# Patient Record
Sex: FEMALE | Race: WHITE | Employment: FULL TIME | URBAN - METROPOLITAN AREA
[De-identification: names, ages, dates, MRNs, and addresses within clinical notes are randomized per-mention and may not be internally consistent; named-entity substitution may affect disease eponyms.]

---

## 2019-07-11 ENCOUNTER — HOSPITAL ENCOUNTER (EMERGENCY)
Age: 24
Discharge: HOME | End: 2019-07-11
Payer: COMMERCIAL

## 2019-07-11 ENCOUNTER — APPOINTMENT (OUTPATIENT)
Dept: GENERAL RADIOLOGY | Age: 24
End: 2019-07-11
Payer: COMMERCIAL

## 2019-07-11 VITALS
HEIGHT: 62 IN | OXYGEN SATURATION: 99 % | RESPIRATION RATE: 16 BRPM | TEMPERATURE: 98.2 F | HEART RATE: 94 BPM | BODY MASS INDEX: 29.08 KG/M2 | SYSTOLIC BLOOD PRESSURE: 148 MMHG | WEIGHT: 158 LBS | DIASTOLIC BLOOD PRESSURE: 117 MMHG
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
## 2019-07-11 DIAGNOSIS — S29.012A STRAIN OF THORACIC BACK REGION: ICD-10-CM

## 2019-07-11 DIAGNOSIS — S39.012A STRAIN OF LUMBAR REGION, INITIAL ENCOUNTER: ICD-10-CM

## 2019-07-11 LAB — HCG(URINE) PREGNANCY TEST: NEGATIVE

## 2019-07-11 PROCEDURE — 72070 X-RAY EXAM THORAC SPINE 2VWS: CPT

## 2019-07-11 PROCEDURE — 84703 CHORIONIC GONADOTROPIN ASSAY: CPT

## 2019-07-11 PROCEDURE — 72100 X-RAY EXAM L-S SPINE 2/3 VWS: CPT

## 2019-07-11 PROCEDURE — 99284 EMERGENCY DEPT VISIT MOD MDM: CPT

## 2019-07-11 PROCEDURE — 6370000000 HC RX 637 (ALT 250 FOR IP): Performed by: PHYSICIAN ASSISTANT

## 2019-07-11 RX ORDER — CYCLOBENZAPRINE HCL 10 MG
10 TABLET ORAL 3 TIMES DAILY PRN
Qty: 15 TABLET | Refills: 0 | Status: SHIPPED | OUTPATIENT
Start: 2019-07-11

## 2019-07-11 RX ORDER — CYCLOBENZAPRINE HCL 10 MG
10 TABLET ORAL ONCE
Status: COMPLETED | OUTPATIENT
Start: 2019-07-11 | End: 2019-07-11

## 2019-07-11 RX ADMIN — CYCLOBENZAPRINE HYDROCHLORIDE 10 MG: 10 TABLET, FILM COATED ORAL at 21:53

## 2019-07-11 ASSESSMENT — ENCOUNTER SYMPTOMS
ABDOMINAL PAIN: 0
SHORTNESS OF BREATH: 0
EYES NEGATIVE: 1
COUGH: 0
COLOR CHANGE: 0
BACK PAIN: 1

## 2019-07-11 ASSESSMENT — PAIN SCALES - GENERAL: PAINLEVEL_OUTOF10: 5
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
## 2022-04-27 ENCOUNTER — OFFICE VISIT (OUTPATIENT)
Dept: OBGYN CLINIC | Age: 27
End: 2022-04-27
Payer: COMMERCIAL

## 2022-04-27 VITALS
BODY MASS INDEX: 38.04 KG/M2 | SYSTOLIC BLOOD PRESSURE: 130 MMHG | DIASTOLIC BLOOD PRESSURE: 76 MMHG | HEART RATE: 97 BPM | TEMPERATURE: 98.8 F | WEIGHT: 208 LBS

## 2022-04-27 DIAGNOSIS — N91.2 AMENORRHEA: ICD-10-CM

## 2022-04-27 DIAGNOSIS — Z11.3 SCREENING EXAMINATION FOR STD (SEXUALLY TRANSMITTED DISEASE): ICD-10-CM

## 2022-04-27 DIAGNOSIS — Z98.891 HISTORY OF C-SECTION: ICD-10-CM

## 2022-04-27 DIAGNOSIS — N91.2 AMENORRHEA: Primary | ICD-10-CM

## 2022-04-27 DIAGNOSIS — I10 CHRONIC HYPERTENSION: ICD-10-CM

## 2022-04-27 DIAGNOSIS — Z87.59 HISTORY OF HEMOLYSIS, ELEVATED LIVER ENZYMES, AND LOW PLATELET (HELLP) SYNDROME: ICD-10-CM

## 2022-04-27 DIAGNOSIS — Z12.4 PAP SMEAR FOR CERVICAL CANCER SCREENING: ICD-10-CM

## 2022-04-27 DIAGNOSIS — O26.899 RH NEGATIVE, ANTEPARTUM: ICD-10-CM

## 2022-04-27 DIAGNOSIS — Z01.419 WOMEN'S ANNUAL ROUTINE GYNECOLOGICAL EXAMINATION: Primary | ICD-10-CM

## 2022-04-27 DIAGNOSIS — Z67.91 RH NEGATIVE, ANTEPARTUM: ICD-10-CM

## 2022-04-27 LAB
BACTERIA: ABNORMAL /HPF
BILIRUBIN URINE: NEGATIVE
BLOOD, URINE: NEGATIVE
CLARITY: CLEAR
COLOR: YELLOW
EPITHELIAL CELLS, UA: 3 /HPF (ref 0–5)
GLUCOSE URINE: NEGATIVE MG/DL
HYALINE CASTS: 0 /LPF (ref 0–8)
KETONES, URINE: NEGATIVE MG/DL
LEUKOCYTE ESTERASE, URINE: ABNORMAL
MICROSCOPIC EXAMINATION: YES
NITRITE, URINE: NEGATIVE
PH UA: 7 (ref 5–8)
PROTEIN UA: NEGATIVE MG/DL
RBC UA: 1 /HPF (ref 0–4)
SPECIFIC GRAVITY UA: 1.01 (ref 1–1.03)
URINE TYPE: ABNORMAL
UROBILINOGEN, URINE: 0.2 E.U./DL
WBC UA: 1 /HPF (ref 0–5)

## 2022-04-27 PROCEDURE — 1036F TOBACCO NON-USER: CPT | Performed by: OBSTETRICS & GYNECOLOGY

## 2022-04-27 PROCEDURE — 76801 OB US < 14 WKS SINGLE FETUS: CPT | Performed by: OBSTETRICS & GYNECOLOGY

## 2022-04-27 PROCEDURE — G8427 DOCREV CUR MEDS BY ELIG CLIN: HCPCS | Performed by: OBSTETRICS & GYNECOLOGY

## 2022-04-27 PROCEDURE — 99203 OFFICE O/P NEW LOW 30 MIN: CPT | Performed by: OBSTETRICS & GYNECOLOGY

## 2022-04-27 PROCEDURE — 81025 URINE PREGNANCY TEST: CPT | Performed by: OBSTETRICS & GYNECOLOGY

## 2022-04-27 PROCEDURE — G8417 CALC BMI ABV UP PARAM F/U: HCPCS | Performed by: OBSTETRICS & GYNECOLOGY

## 2022-04-27 RX ORDER — NIFEDIPINE 30 MG/1
30 TABLET, FILM COATED, EXTENDED RELEASE ORAL 2 TIMES DAILY
COMMUNITY

## 2022-04-28 LAB
CANDIDA SPECIES, DNA PROBE: ABNORMAL
CRL: NORMAL
GARDNERELLA VAGINALIS, DNA PROBE: ABNORMAL
SAC DIAMETER: NORMAL
TRICHOMONAS VAGINALIS DNA: ABNORMAL

## 2022-04-29 LAB
C TRACH DNA GENITAL QL NAA+PROBE: NEGATIVE
N. GONORRHOEAE DNA: NEGATIVE
URINE CULTURE, ROUTINE: NORMAL

## 2022-04-29 NOTE — PROGRESS NOTES
Subjective:      Patient ID: Kisha Wisdom is a 28 y.o. female. HPI  'Nanci'  26 y/o  female presents for new patient evaluation of missed menses/amenorrhea. Last pap smear was 20--normal.  Menarche occurred age [age]. Menses occur every month x 6-7 days, medium flow. Discontinued OCPs and since that time has noted dysmenorrhea/pelvic pain located at her  scar. FDLMP: 22. Denies vaginal bleeding and discharge since February. History is positive for low transverse  on 13 secondary to HELLP, severe pre-eclampsia. Medical history is positive for chronic HTN--takes nifedipine 30mg daily. Surgical history is positive for C/Section, surgery to tailbone x 6 and emergency appendectomy (ruptured) in 2019. Denies history of ovarian cysts, uterine fibroids, pelvic infections and endometriosis. Sees primary care at [facility] in [facility]. Allergies: cefzil--hives  20--HgA1C 5.3    Review of Systems   Constitutional: Negative for activity change, appetite change, chills, fatigue, fever and unexpected weight change. Respiratory: Negative for shortness of breath. Cardiovascular: Negative for chest pain. Gastrointestinal: Negative for abdominal distention, abdominal pain, constipation, diarrhea, nausea and vomiting. Endocrine: Negative for cold intolerance and heat intolerance. Genitourinary: Positive for menstrual problem. Negative for difficulty urinating, dyspareunia, dysuria, frequency, genital sores, hematuria, pelvic pain, vaginal bleeding, vaginal discharge and vaginal pain. Skin: Negative for rash. Neurological: Negative for headaches. Hematological: Does not bruise/bleed easily. Objective:   Physical Exam  Vitals and nursing note reviewed. Exam conducted with a chaperone present. Constitutional:       General: She is not in acute distress. Appearance: Normal appearance. She is well-developed.  She is not ill-appearing or toxic-appearing. HENT:      Head: Normocephalic and atraumatic. Neck:      Thyroid: No thyromegaly. Trachea: Trachea normal.   Cardiovascular:      Rate and Rhythm: Normal rate and regular rhythm. Heart sounds: Normal heart sounds, S1 normal and S2 normal.   Pulmonary:      Effort: Pulmonary effort is normal. No respiratory distress. Breath sounds: Normal breath sounds. Chest:   Breasts: Breasts are symmetrical.      Right: No inverted nipple, mass, nipple discharge, skin change or tenderness. Left: No inverted nipple, mass, nipple discharge, skin change or tenderness. Abdominal:      General: Bowel sounds are normal. There is no distension. Palpations: Abdomen is soft. Abdomen is not rigid. There is no mass. Tenderness: There is no abdominal tenderness. There is no guarding or rebound. Comments: Wound hypertrophy noted. Genitourinary:     General: Normal vulva. Exam position: Lithotomy position. Labia:         Right: No rash, tenderness, lesion or injury. Left: No rash, tenderness, lesion or injury. Vagina: No signs of injury. No vaginal discharge, erythema, tenderness or bleeding. Cervix: No cervical motion tenderness, discharge or friability. Uterus: Not tender. Adnexa:         Right: No mass, tenderness or fullness. Left: No mass, tenderness or fullness. Musculoskeletal:         General: Normal range of motion. Cervical back: Neck supple. Skin:     General: Skin is warm and dry. Findings: No erythema or rash. Nails: There is no clubbing. Neurological:      Mental Status: She is alert and oriented to person, place, and time. Psychiatric:         Mood and Affect: Mood normal.         Speech: Speech normal.         Behavior: Behavior normal. Behavior is cooperative. Thought Content:  Thought content normal.         Judgment: Judgment normal.       OBSTETRIC ULTRASOUND--1ST TRIMESTER    DATE: 2022    PHYSICIAN: KRISTIN Barrett D.O.     SONOGRAPHER: Ronnie Alejandre RUST    INDICATION: Amenorrhea/missed menses    TYPE OF SCAN:  vaginal    FINDINGS:      The cul de sac is normal.  The cervix is normal.  The uterus is gravid. The uterus measures 11.40 cm x 7.52 cm x 6.74 cm. No uterine anomalies are evident. The right ovary is present. The right ovary measures 2.47 cm x 2.27 cm x 2.08 cm. The left ovary is present. The left ovary measures 3.93 cm x 2.94 cm x 2.87 cm. There is a single intrauterine pregnancy identified. A fetal pole is noted with a CRL measuring 2.19 cm, consistent with gestational age of 11 weeks and 6 days and EDC of 2022. There is a 3 day discrepancy when compared with the gestational age of 8 weeks and 3 days and ED[facility] of [facility] Yolk sac is present and measures . 63 cm. Fetal cardiac activity is present at 176 bpm.     IMPRESSION:    Single IUP with cardiac activity. Imaging is limited secondary to bowel gas. Patient is well aware of the limitations of ultrasound in the detection of anomalies. Assessment:       Diagnosis Orders   1. Women's annual routine gynecological examination  C.trachomatis N.gonorrhoeae DNA    PAP SMEAR    VAGINAL PATHOGENS PROBE *A   2. Pap smear for cervical cancer screening  PAP SMEAR   3. Screening examination for STD (sexually transmitted disease)  VAGINAL PATHOGENS PROBE *A   4. Amenorrhea  C.trachomatis N.gonorrhoeae DNA    Culture, Urine    Urinalysis with Microscopic    POCT urine pregnancy    VAGINAL PATHOGENS PROBE *A   5. History of hemolysis, elevated liver enzymes, and low platelet (HELLP) syndrome     6. Chronic hypertension     7. History of      8.  Rh negative, antepartum             Plan:      Orders Placed This Encounter   Procedures    C.trachomatis N.gonorrhoeae DNA    Culture, Urine    Urinalysis with Microscopic    PAP SMEAR    VAGINAL PATHOGENS PROBE *A    PAP SMEAR    POCT urine pregnancy     Options for prenatal testing reviewed: NT, quad screen, free fetal DNA. Risks and benefits discussed. Continue prenatal vitamins. Continue nifedipine  81 mg ASA daily  Home blood pressures  Level 2 ultrasound and consult with MFM  Follow up prn and 4 weeks for initial prenatal visit.          Nga Barrett DO

## 2022-05-14 PROBLEM — I10 CHRONIC HYPERTENSION: Status: ACTIVE | Noted: 2022-05-14

## 2022-05-14 PROBLEM — Z87.59 HISTORY OF HEMOLYSIS, ELEVATED LIVER ENZYMES, AND LOW PLATELET (HELLP) SYNDROME: Status: ACTIVE | Noted: 2022-05-14

## 2022-05-14 PROBLEM — Z98.891 HISTORY OF C-SECTION: Status: ACTIVE | Noted: 2022-05-14

## 2022-05-14 ASSESSMENT — ENCOUNTER SYMPTOMS
ABDOMINAL PAIN: 0
CONSTIPATION: 0
ABDOMINAL DISTENTION: 0
VOMITING: 0
SHORTNESS OF BREATH: 0
DIARRHEA: 0
NAUSEA: 0

## 2022-05-16 ENCOUNTER — NURSE ONLY (OUTPATIENT)
Dept: OBGYN CLINIC | Age: 27
End: 2022-05-16
Payer: COMMERCIAL

## 2022-05-16 VITALS
WEIGHT: 207 LBS | OXYGEN SATURATION: 99 % | RESPIRATION RATE: 18 BRPM | BODY MASS INDEX: 37.86 KG/M2 | SYSTOLIC BLOOD PRESSURE: 122 MMHG | DIASTOLIC BLOOD PRESSURE: 80 MMHG | TEMPERATURE: 98.4 F | HEART RATE: 60 BPM

## 2022-05-16 DIAGNOSIS — Z87.59 HISTORY OF HEMOLYSIS, ELEVATED LIVER ENZYMES, AND LOW PLATELET (HELLP) SYNDROME: Primary | ICD-10-CM

## 2022-05-16 LAB
BASOPHILS ABSOLUTE: 0.1 K/UL (ref 0–0.2)
BASOPHILS RELATIVE PERCENT: 1.4 %
EOSINOPHILS ABSOLUTE: 0.1 K/UL (ref 0–0.6)
EOSINOPHILS RELATIVE PERCENT: 1.8 %
HCT VFR BLD CALC: 37.5 % (ref 36–48)
HEMOGLOBIN: 12.8 G/DL (ref 12–16)
LYMPHOCYTES ABSOLUTE: 1.4 K/UL (ref 1–5.1)
LYMPHOCYTES RELATIVE PERCENT: 34.9 %
MCH RBC QN AUTO: 31 PG (ref 26–34)
MCHC RBC AUTO-ENTMCNC: 34.2 G/DL (ref 31–36)
MCV RBC AUTO: 90.6 FL (ref 80–100)
MONOCYTES ABSOLUTE: 0.6 K/UL (ref 0–1.3)
MONOCYTES RELATIVE PERCENT: 14.9 %
NEUTROPHILS ABSOLUTE: 1.9 K/UL (ref 1.7–7.7)
NEUTROPHILS RELATIVE PERCENT: 47 %
PDW BLD-RTO: 12.9 % (ref 12.4–15.4)
PLATELET # BLD: 206 K/UL (ref 135–450)
PMV BLD AUTO: 8.3 FL (ref 5–10.5)
RBC # BLD: 4.13 M/UL (ref 4–5.2)
WBC # BLD: 4 K/UL (ref 4–11)

## 2022-05-16 PROCEDURE — 36415 COLL VENOUS BLD VENIPUNCTURE: CPT | Performed by: OBSTETRICS & GYNECOLOGY

## 2022-05-17 LAB
A/G RATIO: 1.4 (ref 1.1–2.2)
ALBUMIN SERPL-MCNC: 4.1 G/DL (ref 3.4–5)
ALP BLD-CCNC: 46 U/L (ref 40–129)
ALT SERPL-CCNC: 56 U/L (ref 10–40)
ANION GAP SERPL CALCULATED.3IONS-SCNC: 14 MMOL/L (ref 3–16)
AST SERPL-CCNC: 24 U/L (ref 15–37)
BILIRUB SERPL-MCNC: <0.2 MG/DL (ref 0–1)
BUN BLDV-MCNC: 8 MG/DL (ref 7–20)
CALCIUM SERPL-MCNC: 9.5 MG/DL (ref 8.3–10.6)
CHLORIDE BLD-SCNC: 103 MMOL/L (ref 99–110)
CO2: 19 MMOL/L (ref 21–32)
CREAT SERPL-MCNC: <0.5 MG/DL (ref 0.6–1.1)
GFR AFRICAN AMERICAN: >60
GFR NON-AFRICAN AMERICAN: >60
GLUCOSE BLD-MCNC: 94 MG/DL (ref 70–99)
LACTATE DEHYDROGENASE: 184 U/L (ref 100–190)
POTASSIUM SERPL-SCNC: 4.3 MMOL/L (ref 3.5–5.1)
SODIUM BLD-SCNC: 136 MMOL/L (ref 136–145)
TOTAL PROTEIN: 7 G/DL (ref 6.4–8.2)
URIC ACID, SERUM: 3.7 MG/DL (ref 2.6–6)

## 2022-05-25 ENCOUNTER — INITIAL PRENATAL (OUTPATIENT)
Dept: OBGYN CLINIC | Age: 27
End: 2022-05-25
Payer: COMMERCIAL

## 2022-05-25 VITALS
DIASTOLIC BLOOD PRESSURE: 76 MMHG | BODY MASS INDEX: 38.37 KG/M2 | WEIGHT: 209.8 LBS | SYSTOLIC BLOOD PRESSURE: 118 MMHG | HEART RATE: 77 BPM

## 2022-05-25 DIAGNOSIS — I10 CHRONIC HYPERTENSION: ICD-10-CM

## 2022-05-25 DIAGNOSIS — Z67.91 RH NEGATIVE, ANTEPARTUM: ICD-10-CM

## 2022-05-25 DIAGNOSIS — Z98.891 HISTORY OF C-SECTION: ICD-10-CM

## 2022-05-25 DIAGNOSIS — Z34.81 PRENATAL CARE, SUBSEQUENT PREGNANCY IN FIRST TRIMESTER: Primary | ICD-10-CM

## 2022-05-25 DIAGNOSIS — T14.8XXA SKIN WOUND FROM SURGICAL INCISION: ICD-10-CM

## 2022-05-25 DIAGNOSIS — O99.211 MATERNAL OBESITY SYNDROME IN FIRST TRIMESTER: ICD-10-CM

## 2022-05-25 DIAGNOSIS — O26.899 RH NEGATIVE, ANTEPARTUM: ICD-10-CM

## 2022-05-25 DIAGNOSIS — Z87.59 HISTORY OF HEMOLYSIS, ELEVATED LIVER ENZYMES, AND LOW PLATELET (HELLP) SYNDROME: ICD-10-CM

## 2022-05-25 LAB
BASOPHILS ABSOLUTE: 0 K/UL (ref 0–0.2)
BASOPHILS RELATIVE PERCENT: 0.3 %
EOSINOPHILS ABSOLUTE: 0.1 K/UL (ref 0–0.6)
EOSINOPHILS RELATIVE PERCENT: 1.3 %
HCT VFR BLD CALC: 38 % (ref 36–48)
HEMOGLOBIN: 13 G/DL (ref 12–16)
LYMPHOCYTES ABSOLUTE: 2.3 K/UL (ref 1–5.1)
LYMPHOCYTES RELATIVE PERCENT: 25.5 %
MCH RBC QN AUTO: 30.9 PG (ref 26–34)
MCHC RBC AUTO-ENTMCNC: 34.1 G/DL (ref 31–36)
MCV RBC AUTO: 90.6 FL (ref 80–100)
MONOCYTES ABSOLUTE: 0.6 K/UL (ref 0–1.3)
MONOCYTES RELATIVE PERCENT: 6.5 %
NEUTROPHILS ABSOLUTE: 5.9 K/UL (ref 1.7–7.7)
NEUTROPHILS RELATIVE PERCENT: 66.4 %
PDW BLD-RTO: 12.6 % (ref 12.4–15.4)
PLATELET # BLD: 243 K/UL (ref 135–450)
PMV BLD AUTO: 8.1 FL (ref 5–10.5)
RBC # BLD: 4.2 M/UL (ref 4–5.2)
WBC # BLD: 8.9 K/UL (ref 4–11)

## 2022-05-25 PROCEDURE — 0500F INITIAL PRENATAL CARE VISIT: CPT | Performed by: OBSTETRICS & GYNECOLOGY

## 2022-05-25 PROCEDURE — 36415 COLL VENOUS BLD VENIPUNCTURE: CPT | Performed by: OBSTETRICS & GYNECOLOGY

## 2022-05-25 ASSESSMENT — ENCOUNTER SYMPTOMS
DIARRHEA: 0
CONSTIPATION: 0
SHORTNESS OF BREATH: 0
VOMITING: 0
NAUSEA: 0
ABDOMINAL DISTENTION: 0
ABDOMINAL PAIN: 0

## 2022-05-25 NOTE — PROGRESS NOTES
Subjective:      Patient ID: Araseli Palmer is a 28 y.o.  female, here for IPV @ 12w3d EGA (LMP: 22). HPI   Patient doing well today. Does admit to worsening pain and odor at her mid incision \"bubble\". Pt states that this occurred after she stopped taking her OCPs to get pregnant with this baby but has worsened since last visit. States that nausea is tolerable. Denies any vaginal bleeding or pain. Moods are is also stable. Denies loss of fluid, vaginal bleeding, abdominal pain or contractions. Denies fetal movement. States she has not been contacted by [facility] at this point, reviewed media -- send referral today. Declined  genetic screening today. IPV labs today. MWQ= 3.     Pertinent Hx:   Last pap smear was 20--normal.    Menarche occurred age [age]. Menses occur every month x 6-7 days, medium flow. Discontinued OCPs and since that time has noted dysmenorrhea/pelvic pain located at her  scar      FDLMP: 22. Denies vaginal bleeding and discharge since February. History is positive for low transverse  on 13 secondary to HELLP, severe pre-eclampsia. Medical history is positive for chronic HTN--takes nifedipine 30mg daily. Surgical history is positive for C/Section, surgery to tailbone x 6 and emergency appendectomy (ruptured) in 2019. Denies history of ovarian cysts, uterine fibroids, pelvic infections and endometriosis. [facility] Allergies: cefzil--hives  20--HgA1C 5.3    Review of Systems   Constitutional: Negative for activity change, appetite change, chills, fatigue, fever and unexpected weight change. Respiratory: Negative for shortness of breath. Cardiovascular: Negative for chest pain. Gastrointestinal: Negative for abdominal distention, abdominal pain, constipation, diarrhea, nausea and vomiting. Endocrine: Negative for cold intolerance and heat intolerance. Genitourinary: Positive for menstrual problem. Negative for difficulty urinating, dyspareunia, dysuria, frequency, genital sores, hematuria, pelvic pain, vaginal bleeding, vaginal discharge and vaginal pain. Skin: Negative for rash. Neurological: Negative for headaches. Hematological: Does not bruise/bleed easily. Objective:   Physical Exam  Vitals and nursing note reviewed. Exam conducted with a chaperone present. Constitutional:       General: She is not in acute distress. Appearance: Normal appearance. She is well-developed. She is not ill-appearing or toxic-appearing. HENT:      Head: Normocephalic and atraumatic. Neck:      Thyroid: No thyromegaly. Trachea: Trachea normal.   Cardiovascular:      Rate and Rhythm: Normal rate and regular rhythm. Heart sounds: Normal heart sounds, S1 normal and S2 normal.   Pulmonary:      Effort: Pulmonary effort is normal. No respiratory distress. Breath sounds: Normal breath sounds. Chest:   Breasts: Breasts are symmetrical.      Right: No inverted nipple, mass, nipple discharge, skin change or tenderness. Left: No inverted nipple, mass, nipple discharge, skin change or tenderness. Abdominal:      General: Bowel sounds are normal. There is no distension. Palpations: Abdomen is soft. Abdomen is not rigid. There is no mass. Tenderness: There is no abdominal tenderness. There is no guarding or rebound. Comments: Wound hypertrophy noted / Wound culture collected today. Genitourinary:     General: Normal vulva. Exam position: Lithotomy position. Labia:         Right: No rash, tenderness, lesion or injury. Left: No rash, tenderness, lesion or injury. Vagina: No signs of injury. No vaginal discharge, erythema, tenderness or bleeding. Cervix: No cervical motion tenderness, discharge or friability. Uterus: Not tender. Adnexa:         Right: No mass, tenderness or fullness.

## 2022-05-26 LAB
24HR URINE VOLUME (ML): 1700 ML
ABO/RH: NORMAL
AMPHETAMINE SCREEN, URINE: NORMAL
ANTIBODY SCREEN: NORMAL
BARBITURATE SCREEN URINE: NORMAL
BENZODIAZEPINE SCREEN, URINE: NORMAL
BUPRENORPHINE URINE: NORMAL
CANNABINOID SCREEN URINE: NORMAL
COCAINE METABOLITE SCREEN URINE: NORMAL
CREAT SERPL-MCNC: <0.5 MG/DL (ref 0.6–1.2)
CREATININE 24 HOUR URINE: 1.3 G/24HR (ref 0.6–1.5)
CREATININE CLEARANCE: 160 ML/MIN (ref 88–128)
HEPATITIS B SURFACE ANTIGEN INTERPRETATION: NORMAL
HIV AG/AB: NORMAL
HIV ANTIGEN: NORMAL
HIV-1 ANTIBODY: NORMAL
HIV-2 AB: NORMAL
Lab: 24 HR
Lab: NORMAL
METHADONE SCREEN, URINE: NORMAL
OPIATE SCREEN URINE: NORMAL
OXYCODONE URINE: NORMAL
PH UA: 6
PHENCYCLIDINE SCREEN URINE: NORMAL
PROPOXYPHENE SCREEN: NORMAL
PROTEIN 24 HOUR URINE: 0.12 G/24HR
RUBELLA ANTIBODY IGG: 80.5 IU/ML
TOTAL SYPHILLIS IGG/IGM: NORMAL
TSH REFLEX: 2.59 UIU/ML (ref 0.27–4.2)
VARICELLA-ZOSTER VIRUS AB, IGG: NORMAL

## 2022-05-27 LAB
HEPATITIS C VIRUS AB BY CIA INDEX: 0.21 IV
HEPATITIS C VIRUS AB BY CIA INTERPRETATION: NEGATIVE

## 2022-05-29 LAB
GRAM STAIN RESULT: ABNORMAL
ORGANISM: ABNORMAL
WOUND/ABSCESS: ABNORMAL
WOUND/ABSCESS: ABNORMAL

## 2022-05-31 RX ORDER — SULFAMETHOXAZOLE AND TRIMETHOPRIM 800; 160 MG/1; MG/1
1 TABLET ORAL 2 TIMES DAILY
Qty: 14 TABLET | Refills: 0 | Status: SHIPPED | OUTPATIENT
Start: 2022-05-31 | End: 2022-06-07

## 2022-06-21 ENCOUNTER — ROUTINE PRENATAL (OUTPATIENT)
Dept: OBGYN CLINIC | Age: 27
End: 2022-06-21

## 2022-06-21 VITALS
WEIGHT: 209.4 LBS | BODY MASS INDEX: 38.3 KG/M2 | SYSTOLIC BLOOD PRESSURE: 130 MMHG | HEART RATE: 105 BPM | DIASTOLIC BLOOD PRESSURE: 80 MMHG

## 2022-06-21 DIAGNOSIS — Z87.59 HISTORY OF HEMOLYSIS, ELEVATED LIVER ENZYMES, AND LOW PLATELET (HELLP) SYNDROME: ICD-10-CM

## 2022-06-21 DIAGNOSIS — Z98.891 HISTORY OF C-SECTION: ICD-10-CM

## 2022-06-21 DIAGNOSIS — Z67.91 RH NEGATIVE, ANTEPARTUM: ICD-10-CM

## 2022-06-21 DIAGNOSIS — O26.899 RH NEGATIVE, ANTEPARTUM: ICD-10-CM

## 2022-06-21 DIAGNOSIS — I10 CHRONIC HYPERTENSION: ICD-10-CM

## 2022-06-21 DIAGNOSIS — Z34.82 PRENATAL CARE, SUBSEQUENT PREGNANCY IN SECOND TRIMESTER: Primary | ICD-10-CM

## 2022-06-21 PROCEDURE — 0502F SUBSEQUENT PRENATAL CARE: CPT | Performed by: OBSTETRICS & GYNECOLOGY

## 2022-06-28 ENCOUNTER — NURSE ONLY (OUTPATIENT)
Dept: OBGYN CLINIC | Age: 27
End: 2022-06-28
Payer: COMMERCIAL

## 2022-06-28 VITALS
SYSTOLIC BLOOD PRESSURE: 110 MMHG | TEMPERATURE: 98.2 F | BODY MASS INDEX: 38.19 KG/M2 | DIASTOLIC BLOOD PRESSURE: 87 MMHG | WEIGHT: 208.8 LBS | HEART RATE: 98 BPM

## 2022-06-28 DIAGNOSIS — O99.211 MATERNAL OBESITY SYNDROME IN FIRST TRIMESTER: ICD-10-CM

## 2022-06-28 DIAGNOSIS — Z34.81 PRENATAL CARE, SUBSEQUENT PREGNANCY IN FIRST TRIMESTER: Primary | ICD-10-CM

## 2022-06-28 LAB
BASOPHILS ABSOLUTE: 0 K/UL (ref 0–0.2)
BASOPHILS RELATIVE PERCENT: 0.5 %
EOSINOPHILS ABSOLUTE: 0.1 K/UL (ref 0–0.6)
EOSINOPHILS RELATIVE PERCENT: 1.3 %
GLUCOSE CHALLENGE: 77 MG/DL
HCT VFR BLD CALC: 34.1 % (ref 36–48)
HEMOGLOBIN: 11.9 G/DL (ref 12–16)
LYMPHOCYTES ABSOLUTE: 2 K/UL (ref 1–5.1)
LYMPHOCYTES RELATIVE PERCENT: 19.6 %
MCH RBC QN AUTO: 31.5 PG (ref 26–34)
MCHC RBC AUTO-ENTMCNC: 35 G/DL (ref 31–36)
MCV RBC AUTO: 90 FL (ref 80–100)
MONOCYTES ABSOLUTE: 0.7 K/UL (ref 0–1.3)
MONOCYTES RELATIVE PERCENT: 6.4 %
NEUTROPHILS ABSOLUTE: 7.3 K/UL (ref 1.7–7.7)
NEUTROPHILS RELATIVE PERCENT: 72.2 %
PDW BLD-RTO: 12.8 % (ref 12.4–15.4)
PLATELET # BLD: 222 K/UL (ref 135–450)
PMV BLD AUTO: 7.7 FL (ref 5–10.5)
RBC # BLD: 3.78 M/UL (ref 4–5.2)
WBC # BLD: 10.1 K/UL (ref 4–11)

## 2022-06-28 PROCEDURE — 36415 COLL VENOUS BLD VENIPUNCTURE: CPT | Performed by: OBSTETRICS & GYNECOLOGY

## 2022-06-30 ENCOUNTER — PATIENT MESSAGE (OUTPATIENT)
Dept: OBGYN CLINIC | Age: 27
End: 2022-06-30

## 2022-06-30 NOTE — TELEPHONE ENCOUNTER
From: Adriano Tovar  To: Dr. Apple Maria: 6/30/2022 4:49 PM EDT  Subject: Billing for [facility]    We was told to upload the bill from [address] because it was so high and we believe they charged us for a level 2 ultrasound and I haven't had one yet. I believe it is Kaylene Cowan the  that was going to look at it for us. Attached is the 2 bills we received.

## 2022-07-05 NOTE — TELEPHONE ENCOUNTER
Patient called to let me know she is needing an out of network exception form filled out. I contacted Vickie ESQUIVEL to send the form to us. Will fill out and fax back once received.

## 2022-07-05 NOTE — TELEPHONE ENCOUNTER
Let patient know per insurance bill looks accurate with the report we received from [facility]. If she has any questions or feels it is an error to call [facility].

## 2022-07-10 PROBLEM — Z34.82 PRENATAL CARE, SUBSEQUENT PREGNANCY IN SECOND TRIMESTER: Status: ACTIVE | Noted: 2022-07-10

## 2022-07-11 NOTE — PROGRESS NOTES
27 y/o  female at 16 weeks 2 days gestation with [address] 22 presents for prenatal visit. Pregnancy is complicated by low transverse  on 13 secondary to HELLP, severe pre-eclampsia. Medical history is positive for chronic HTN--takes nifedipine 30mg daily. Surgical history is positive for C/Section, surgery to tailbone x 6 and emergency appendectomy (ruptured) in 2019. Seen by [facility] 2 weeks ago--[facility] is in network. Advised to increase blood pressure medication to Q 12 hours--has not increased yet. Blood work was performed--antiphospholipid antibody testing is negative. Se[facility] in [facility]. 20--HgA1C 5.3  Treated with antibiotic since last visit-- scar wound culture positive for Staph aureus MRSA  Declines NIPT testing. Denies vaginal bleeding, loss of fluid, contractions, pelvic pain. Admits to fetal movement. Denies headache, vision change, and RUQ pain. Admits to constipation. Denies fever, chills, chest pain, shortness of breath, nausea, vomiting, diarrhea, dysuria and hematuria. Diagnosis Orders   1. History of hemolysis, elevated liver enzymes, and low platelet (HELLP) syndrome     2. Prenatal care, subsequent pregnancy in second trimester     3. History of      4. Rh negative, antepartum     5. Chronic hypertension       Continue prenatal vitamin daily  Continue nifedipine 30 mg daily--increase to BID per [facility] recommendations  Continue 81 mg ASA dialy  Home blood pressures  Level 2 ultrasound with [facility]  Serial growth scans every 4 weeks after 20 weeks gestation  Twice weekly fetal monitoring at 32 weeks. Early 1 hour GTT and HgA1C  Lovenox or LMWH and antibiotics if delivery via . Follow up prn and 4 weeks for prenatal visit.

## 2022-07-20 ENCOUNTER — ROUTINE PRENATAL (OUTPATIENT)
Dept: OBGYN CLINIC | Age: 27
End: 2022-07-20

## 2022-07-20 ENCOUNTER — OFFICE VISIT (OUTPATIENT)
Dept: OBGYN CLINIC | Age: 27
End: 2022-07-20
Payer: COMMERCIAL

## 2022-07-20 VITALS
DIASTOLIC BLOOD PRESSURE: 82 MMHG | SYSTOLIC BLOOD PRESSURE: 116 MMHG | BODY MASS INDEX: 38.26 KG/M2 | WEIGHT: 209.2 LBS | HEART RATE: 91 BPM | TEMPERATURE: 98.1 F

## 2022-07-20 DIAGNOSIS — I10 CHRONIC HYPERTENSION: ICD-10-CM

## 2022-07-20 DIAGNOSIS — Z98.891 HISTORY OF C-SECTION: ICD-10-CM

## 2022-07-20 DIAGNOSIS — Z34.82 PRENATAL CARE, SUBSEQUENT PREGNANCY IN SECOND TRIMESTER: Primary | ICD-10-CM

## 2022-07-20 DIAGNOSIS — O26.899 RH NEGATIVE, ANTEPARTUM: ICD-10-CM

## 2022-07-20 DIAGNOSIS — Z67.91 RH NEGATIVE, ANTEPARTUM: ICD-10-CM

## 2022-07-20 DIAGNOSIS — Z87.59 HISTORY OF HEMOLYSIS, ELEVATED LIVER ENZYMES, AND LOW PLATELET (HELLP) SYNDROME: ICD-10-CM

## 2022-07-20 DIAGNOSIS — R30.0 DYSURIA: ICD-10-CM

## 2022-07-20 LAB
ABDOMINAL CIRCUMFERENCE: NORMAL
BIPARIETAL DIAMETER: NORMAL
ESTIMATED FETAL WEIGHT: NORMAL
FEMORAL DIAMETER: NORMAL
HC/AC: NORMAL
HEAD CIRCUMFERENCE: NORMAL

## 2022-07-20 PROCEDURE — 0502F SUBSEQUENT PRENATAL CARE: CPT | Performed by: OBSTETRICS & GYNECOLOGY

## 2022-07-20 PROCEDURE — 76805 OB US >/= 14 WKS SNGL FETUS: CPT | Performed by: OBSTETRICS & GYNECOLOGY

## 2022-07-20 NOTE — PROGRESS NOTES
Return OB Office Visit    CC:   Chief Complaint   Patient presents with    Routine Prenatal Visit       HPI:  Patient seen and examined. Patient is doing well today. Reports she has had some burning when she pees, as well as increased urinary frequency. Denies VB, LOF, ctx. +FM. Denies headaches, vision changes, RUQ pain, increased LE edema. Denies chest pain, shortness of breath, fever, chills, nausea, vomiting. Patient with chronic hypertension, taking Nifedipine 30 mg daily. Has been checking at home and systolics have been under 140. Had 13 week scan with [facility], however did not have anatomy scheduled. Reports she was meant to be referred to [facility] as  is out of network and she desires to have level 2 US with [facility].      Review of Systems: The following ROS was otherwise negative, except as noted in the HPI: constitutional, HEENT, respiratory, cardiovascular, gastrointestinal, genitourinary, skin, musculoskeletal, neurological, psych    Objective:  /82   Pulse 91   Temp 98.1 °F (36.7 °C) (Oral)   Wt 209 lb 3.2 oz (94.9 kg)   LMP 02/27/2022   BMI 38.26 kg/m²     Physical Exam  Vitals reviewed. Constitutional:       General: She is not in acute distress. Appearance: She is well-developed. HENT:      Head: Normocephalic and atraumatic. Eyes:      Conjunctiva/sclera: Conjunctivae normal.   Cardiovascular:      Rate and Rhythm: Normal rate. Pulmonary:      Effort: Pulmonary effort is normal. No respiratory distress. Abdominal:      General: There is no distension. Palpations: Abdomen is soft. Tenderness: There is no abdominal tenderness. There is no guarding or rebound. Musculoskeletal:         General: No swelling. Skin:     General: Skin is warm and dry. Neurological:      Mental Status: She is alert and oriented to person, place, and time.    Psychiatric:         Mood and Affect: Mood normal.         Behavior: Behavior normal.         Thought Content: Thought content normal.       Ultrasound:   OBSTETRIC ULTRASOUND -- SECOND TRIMESTER    DATE:  07/20/2022    PHYSICIAN: AMBER Noonan.    SONOGRAPHER: Emerald Maurice RDMS    INDICATION:  Second trimester, Anatomical screening    TYPE OF SCAN: abdominal, vaginal 3.5 MHz 5MHz    FINDINGS:      A single viable intrauterine pregnancy is noted in cephalic presentation. Cardiac and somatic activity are noted. The following values were obtained:     Fetal heart rate    141bpm     BPD      4.97cm  63.0 %   Head Circumference    19.34cm 77.9 %    Abdominal Circumference   15.36cm 38.0 %   Femur Length     3.62cm  68.4 %   Humerus Length    3.52cm  89.6 %   Cerebellum     2.32cm  88.1 %     Amniotic fluid DVP    4.86cm     EFW      395g  62.0 percentile    Subjective amniotic fluid volume is normal. Based on sonographic criteria, the estimated fetal age is 21weeks and 3days with EDC of 11/27/2022. There is a 6 day discordance with the established EDC of 12/02/2022. The patient has a posterior placenta that is adequate distance in relation to the internal cervical os. The evaluation of the lower uterine segment and cervix reveals normal appearing anatomy. Transvaginal cervical length is 4.8cm with no funneling noted. The uterus is unremarkable/gravid. Maternal ovaries and adnexae are not well visualized due to the size of the uterus and patient's gravid state. Normal Anatomy Seen:           CSP  LVOT     Kidneys   Lateral ventricles  RVOT     Umbilical arteries  Cerebellum  Ductal arch    Bladder   Cisterna magna  Aortic arch    Face    Nuchal fold  Diaphragm    Profile    Upper extremities  Stomach    Nose/lips   Lower extremities  ACI     PCI    Choroid plexus    Suboptimal anatomy seen: orbits, 4 chamber heart, spine    Abnormal anatomy seen: The fetal genitalia is noted to be Female. IMPRESSION:  Single living IUP. No gross structural abnormalities are visualized.  Amniotic fluid volume is subjectively normal.  Sub-optimal orbits, 4 chamber heart, spine    The patient is well aware of the limitations of ultrasound in the detection of fetal anomalies. The scan is limited by fetal position. Assessment/Plan:   Charlotte Munoz is a 28 y.o.  at 20w3d who presents for routine OB visit    1. Prenatal care, subsequent pregnancy in second trimester     - Patient is overall doing well today     - Fetal wellbeing reassuring by US today     - Maternal wellness questionnaire reviewed - no concerns today     - Anatomy scan Posterior placenta, no previa, CL 4.8cm. BHAVNA wnl. No gross anatomic abnormalities. Sub-optimal orbits, 4 chamber heart, spine.     - early 1 hr GCT within normal limits     - Labor, decreased FM, VB, LOF precautions reviewed     - Return precautions reviewed     - Patient was intended to have level 2 US, however [facility] out of network therefore did not have completed, sub-optimal images today. Will refer to [facility] as they are in network with patient's insurance     - Will follow-up in 4 weeks for JOSÉ visit - referral placed for completion of anatomy scan with [facility]    2. Chronic hypertension     - Normotensive today     - Procardia 30 XL daily     - Reports BP at home less than 999 systolic     - Taking ASA daily     - Will plan for serial growth scans every 4 weeks        - Twice weekly  testing at 32 weeks    3. Rh negative, antepartum     - Plan for Rhogam at 28 weeks     4. History of     5. History of hemolysis, elevated liver enzymes, and low platelet (HELLP) syndrome     - No complaints today    6.  Dysuria     - Will rule out UTI today and treat based on findings  - Urinalysis with Microscopic    Vishaleva Vang DO

## 2022-07-21 LAB
BACTERIA: ABNORMAL /HPF
BILIRUBIN URINE: NEGATIVE
BLOOD, URINE: NEGATIVE
CLARITY: CLEAR
COLOR: YELLOW
EPITHELIAL CELLS, UA: 3 /HPF (ref 0–5)
GLUCOSE URINE: NEGATIVE MG/DL
HYALINE CASTS: 0 /LPF (ref 0–8)
KETONES, URINE: NEGATIVE MG/DL
LEUKOCYTE ESTERASE, URINE: ABNORMAL
MICROSCOPIC EXAMINATION: YES
NITRITE, URINE: NEGATIVE
PH UA: 7 (ref 5–8)
PROTEIN UA: NEGATIVE MG/DL
RBC UA: 1 /HPF (ref 0–4)
SPECIFIC GRAVITY UA: 1.01 (ref 1–1.03)
URINE TYPE: ABNORMAL
UROBILINOGEN, URINE: 0.2 E.U./DL
WBC UA: 35 /HPF (ref 0–5)

## 2022-07-23 LAB
ORGANISM: ABNORMAL
URINE CULTURE, ROUTINE: ABNORMAL

## 2022-07-28 NOTE — TELEPHONE ENCOUNTER
[employer] is updating Dr. Kalen Schroeder tax id case #[id number]. This will take 24-48 hours to update. Will check Monday and try to submit OON form through [employer].

## 2022-08-04 ENCOUNTER — TELEPHONE (OUTPATIENT)
Dept: OBGYN CLINIC | Age: 27
End: 2022-08-04

## 2022-08-04 DIAGNOSIS — Z36.3 ENCOUNTER FOR ANTENATAL SCREENING FOR MALFORMATION: Primary | ICD-10-CM

## 2022-08-04 NOTE — TELEPHONE ENCOUNTER
Received call from [facility] that referral was needed. Pended. Please sign and return for fax.  Thanks

## 2022-08-04 NOTE — TELEPHONE ENCOUNTER
Faxed Clinicals,Demographics and insurance information to  [facility] at [phone].

## 2022-08-04 NOTE — TELEPHONE ENCOUNTER
Completed the OON form on [employer] today. Case is pending review with auth # [id number]. Updated patient, and will update with a final outcome.

## 2022-08-17 ENCOUNTER — ROUTINE PRENATAL (OUTPATIENT)
Dept: OBGYN CLINIC | Age: 27
End: 2022-08-17

## 2022-08-17 VITALS
DIASTOLIC BLOOD PRESSURE: 68 MMHG | SYSTOLIC BLOOD PRESSURE: 110 MMHG | HEART RATE: 102 BPM | BODY MASS INDEX: 39.03 KG/M2 | WEIGHT: 213.4 LBS

## 2022-08-17 DIAGNOSIS — Z34.82 PRENATAL CARE, SUBSEQUENT PREGNANCY IN SECOND TRIMESTER: Primary | ICD-10-CM

## 2022-08-17 DIAGNOSIS — Z98.891 HISTORY OF C-SECTION: ICD-10-CM

## 2022-08-17 DIAGNOSIS — Z67.91 RH NEGATIVE, ANTEPARTUM: ICD-10-CM

## 2022-08-17 DIAGNOSIS — Z87.59 HISTORY OF HEMOLYSIS, ELEVATED LIVER ENZYMES, AND LOW PLATELET (HELLP) SYNDROME: ICD-10-CM

## 2022-08-17 DIAGNOSIS — O26.899 RH NEGATIVE, ANTEPARTUM: ICD-10-CM

## 2022-08-17 DIAGNOSIS — I10 CHRONIC HYPERTENSION: ICD-10-CM

## 2022-08-17 PROCEDURE — 0502F SUBSEQUENT PRENATAL CARE: CPT | Performed by: OBSTETRICS & GYNECOLOGY

## 2022-08-17 RX ORDER — ASPIRIN 81 MG/1
81 TABLET ORAL DAILY
COMMUNITY

## 2022-08-17 NOTE — PROGRESS NOTES
Temp 98.9 F   infared     Maternal emotional well being screening form completed and reviewed with patient. Current score is 0. Patient given referral to [facility] ([phone]):  No

## 2022-08-21 ENCOUNTER — TELEPHONE (OUTPATIENT)
Dept: OBGYN | Age: 27
End: 2022-08-21

## 2022-08-22 NOTE — PROGRESS NOTES
29 y/o  female at 24 weeks 3 days gestation with [facility] 22 presents for prenatal visit. Pregnancy is complicated by low transverse  on 13 secondary to HELLP, severe pre-eclampsia and recent UTI---treated with macrobid. Medical history is positive for chronic HTN--takes nifedipine 30mg daily. Home blood pressures: 100-130's/70-80's  Surgical history is positive for C/Section, surgery to tailbone x 6 and emergency appendectomy (ruptured) in 2019. Seen by [facility] --[facility] is in network. Advised to increase blood pressure medication to Q 12 hours--has not increased yet. Blood work was performed--antiphospholipid antibody testing is negative. [facility] care at [facility] in [facility]. 20--HgA1C 5.3  Treated with antibiotic since last visit-- scar wound culture positive for Staph aureus MRSA  Declines NIPT testing. Denies vaginal bleeding, loss of fluid, contractions, pelvic pain. Admits to fetal movement. Denies headache and RUQ pain. Has issues with vision--distance only--in AMs  Admits to constipation. Denies fever, chills, chest pain, shortness of breath, nausea, vomiting, diarrhea, dysuria and hematuria. Maternal Wellness Questionnaire reviewed--no concers. Diagnosis Orders   1. Prenatal care, subsequent pregnancy in second trimester        2. Rh negative, antepartum        3. History of hemolysis, elevated liver enzymes, and low platelet (HELLP) syndrome        4. History of         5.  Chronic hypertension          Continue prenatal vitamin daily  Continue nifedipine 30 mg daily--increase to BID per [facility] recommendations  Continue 81 mg ASA dialy  Home blood pressures  Growth scan every 4 weeks  Twice weekly  testing at 32 weeks  Early 1 hour GTT and HgA1C  Follow up in 1-2 weeks for prenatal visit and ultrasound--growth, follow up anatomy--orbits, 4 chamber heart, spine  Lovenox or LMWH and antibiotics if delivery via .

## 2022-08-29 ENCOUNTER — ROUTINE PRENATAL (OUTPATIENT)
Dept: OBGYN CLINIC | Age: 27
End: 2022-08-29
Payer: COMMERCIAL

## 2022-08-29 ENCOUNTER — OFFICE VISIT (OUTPATIENT)
Dept: OBGYN CLINIC | Age: 27
End: 2022-08-29
Payer: COMMERCIAL

## 2022-08-29 VITALS
BODY MASS INDEX: 39.14 KG/M2 | WEIGHT: 214 LBS | HEART RATE: 115 BPM | DIASTOLIC BLOOD PRESSURE: 84 MMHG | SYSTOLIC BLOOD PRESSURE: 130 MMHG

## 2022-08-29 DIAGNOSIS — Z34.82 PRENATAL CARE, SUBSEQUENT PREGNANCY IN SECOND TRIMESTER: Primary | ICD-10-CM

## 2022-08-29 DIAGNOSIS — Z98.891 HISTORY OF C-SECTION: ICD-10-CM

## 2022-08-29 DIAGNOSIS — I10 CHRONIC HYPERTENSION: ICD-10-CM

## 2022-08-29 DIAGNOSIS — Z67.91 RH NEGATIVE, ANTEPARTUM: ICD-10-CM

## 2022-08-29 DIAGNOSIS — Z87.59 HISTORY OF HEMOLYSIS, ELEVATED LIVER ENZYMES, AND LOW PLATELET (HELLP) SYNDROME: ICD-10-CM

## 2022-08-29 DIAGNOSIS — O26.899 RH NEGATIVE, ANTEPARTUM: ICD-10-CM

## 2022-08-29 PROCEDURE — 90715 TDAP VACCINE 7 YRS/> IM: CPT | Performed by: OBSTETRICS & GYNECOLOGY

## 2022-08-29 PROCEDURE — 36415 COLL VENOUS BLD VENIPUNCTURE: CPT | Performed by: OBSTETRICS & GYNECOLOGY

## 2022-08-29 PROCEDURE — 90471 IMMUNIZATION ADMIN: CPT | Performed by: OBSTETRICS & GYNECOLOGY

## 2022-08-29 PROCEDURE — 0502F SUBSEQUENT PRENATAL CARE: CPT | Performed by: OBSTETRICS & GYNECOLOGY

## 2022-08-29 PROCEDURE — 76816 OB US FOLLOW-UP PER FETUS: CPT | Performed by: OBSTETRICS & GYNECOLOGY

## 2022-08-29 NOTE — PROGRESS NOTES
See ultrasound report Technically he should not stop brilinta until 8/31/18, which will complete one year of dual antiplatelet therapy after his stent  If he is unwilling to resume brilinta, he should at least take aspirin 81 mg daily to prevent stent from clotting off  He can come in for EKG and BP check

## 2022-08-29 NOTE — PROGRESS NOTES
Temp 98.1 F   infared     Maternal emotional well being screening form completed and reviewed with patient. Current score is 0. Patient given referral to [facility] ([phone]):  No

## 2022-08-30 LAB
A/G RATIO: 1.6 (ref 1.1–2.2)
ALBUMIN SERPL-MCNC: 3.8 G/DL (ref 3.4–5)
ALP BLD-CCNC: 70 U/L (ref 40–129)
ALT SERPL-CCNC: 36 U/L (ref 10–40)
ANION GAP SERPL CALCULATED.3IONS-SCNC: 14 MMOL/L (ref 3–16)
AST SERPL-CCNC: 17 U/L (ref 15–37)
BASOPHILS ABSOLUTE: 0.1 K/UL (ref 0–0.2)
BASOPHILS RELATIVE PERCENT: 0.6 %
BILIRUB SERPL-MCNC: <0.2 MG/DL (ref 0–1)
BUN BLDV-MCNC: 6 MG/DL (ref 7–20)
CALCIUM SERPL-MCNC: 8.8 MG/DL (ref 8.3–10.6)
CHLORIDE BLD-SCNC: 103 MMOL/L (ref 99–110)
CO2: 19 MMOL/L (ref 21–32)
CREAT SERPL-MCNC: <0.5 MG/DL (ref 0.6–1.1)
EOSINOPHILS ABSOLUTE: 0.1 K/UL (ref 0–0.6)
EOSINOPHILS RELATIVE PERCENT: 0.9 %
ESTIMATED AVERAGE GLUCOSE: 96.8 MG/DL
GFR AFRICAN AMERICAN: >60
GFR NON-AFRICAN AMERICAN: >60
GLUCOSE BLD-MCNC: 112 MG/DL (ref 70–99)
GLUCOSE CHALLENGE: 112 MG/DL
HBA1C MFR BLD: 5 %
HCT VFR BLD CALC: 33.5 % (ref 36–48)
HEMOGLOBIN: 11.6 G/DL (ref 12–16)
LYMPHOCYTES ABSOLUTE: 1.6 K/UL (ref 1–5.1)
LYMPHOCYTES RELATIVE PERCENT: 17.4 %
MCH RBC QN AUTO: 31.3 PG (ref 26–34)
MCHC RBC AUTO-ENTMCNC: 34.7 G/DL (ref 31–36)
MCV RBC AUTO: 90.2 FL (ref 80–100)
MONOCYTES ABSOLUTE: 0.5 K/UL (ref 0–1.3)
MONOCYTES RELATIVE PERCENT: 6.1 %
NEUTROPHILS ABSOLUTE: 6.7 K/UL (ref 1.7–7.7)
NEUTROPHILS RELATIVE PERCENT: 75 %
PDW BLD-RTO: 13.3 % (ref 12.4–15.4)
PLATELET # BLD: 253 K/UL (ref 135–450)
PMV BLD AUTO: 7.7 FL (ref 5–10.5)
POTASSIUM SERPL-SCNC: 3.7 MMOL/L (ref 3.5–5.1)
RBC # BLD: 3.71 M/UL (ref 4–5.2)
SODIUM BLD-SCNC: 136 MMOL/L (ref 136–145)
TOTAL PROTEIN: 6.2 G/DL (ref 6.4–8.2)
WBC # BLD: 8.9 K/UL (ref 4–11)

## 2022-08-30 NOTE — PROGRESS NOTES
29 y/o  female at 32 weeks 1 day gestation with [facility] 22 presents for prenatal visit and ultrasound   Pregnancy is complicated by low transverse  on 13 secondary to HELLP, severe pre-eclampsia and recent UTI---treated with macrobid. Medical history is positive for chronic HTN--takes nifedipine 30mg daily. Home blood pressures: 110's-120's/70's-80's  Surgical history is positive for C/Section, surgery to tailbone x 6 and emergency appendectomy (ruptured) in 2019. Interested in repeat . Seen by [facility] --[facility] is in network. Advised to increase blood pressure medication to Q 12 hours--has not increased yet due to stability of blood pressures  Blood work was performed--antiphospholipid antibody testing is negative. Sees primary care at [facility] in [facility]. Declines NIPT testing. Denies vaginal bleeding, loss of fluid, contractions, pelvic pain. Admits to fetal movement. Denies headache and RUQ pain. Has issues with vision--distance only  Has had COVID-19 since last visit--fever, shortness of breath have resolved and improved respectively. Experienced some dizziness and presyncopal symptoms last night--resolved. Conservative measures reviewed. Admits to constipation. Denies fever, chills, chest pain, shortness of breath, nausea, vomiting, diarrhea, dysuria and hematuria. Maternal Wellness Questionnaire reviewed--no concerns. OBSTETRIC ULTRASOUND GROWTH    DATE: 2022    PHYSICIAN: KRISTIN Barrett D.O.     SONOGRAPHER: Julia Odell [facility]    INDICATION: Growth,     TYPE OF SCAN: abdominal    FINDINGS:  A single viable intrauterine pregnancy is noted in cephalic presentation. Cardiac and somatic activity are noted.     The following values were obtained:   Fetal heart rate    155 bpm   BPD      6.47cm  29.7 %   Head Circumference    24.14cm  18.3 %    Abdominal Circumference   22.37cm  52.7 %   Femur Length     5.31cm  84.9 %   Amniotic fluid index    6.34cm   EFW      1038g  69.0 percentile    Amniotic fluid volume is normal. Based on sonographic criteria the estimated fetal age is 26weeks and 6days with EDC of 2022. There is a 4 day discordance with the established EDC of 2022. The patient has a posterior placenta that is adequate distance in relation to the internal cervical os. The evaluation of the lower uterine segment and cervix reveals normal appearing anatomy. The uterus is unremarkable/gravid. Maternal ovaries and adnexae are not well visualized due to the size of the uterus and patient's gravid state. Anatomy seen includes: 4 chamber heart, stomach, kidneys, bladder, prbits, and spine. IMPRESSION:  Single live IUP in the second trimester. Adequate interval fetal growth. Imaging is limited secondary to fetal position. The patient is well aware of the limitations of ultrasound in the detection of anomalies. Diagnosis Orders   1. Prenatal care, subsequent pregnancy in second trimester  CBC with Auto Differential    GLUCOSE CHALLENGE GESTATIONAL    Hemoglobin A1C    Comprehensive Metabolic Panel      2. History of hemolysis, elevated liver enzymes, and low platelet (HELLP) syndrome  CBC with Auto Differential    Comprehensive Metabolic Panel      3. Chronic hypertension  CBC with Auto Differential    Comprehensive Metabolic Panel      4. Rh negative, antepartum        5.  History of           Orders Placed This Encounter   Procedures    CBC with Auto Differential    GLUCOSE CHALLENGE GESTATIONAL    Hemoglobin A1C    Comprehensive Metabolic Panel     Continue prenatal vitamin daily  Continue nifedipine 30 mg daily  Continue 81 mg ASA daily  Home blood pressures  Growth scan every 4 weeks  Twice weekly  testing at 32 weeks  Follow up prn and 2 weeks for prenatal visit  Schedule repeat  at next visit--antibiotics and Lovenox/LMWH postoperatively

## 2022-09-14 ENCOUNTER — ROUTINE PRENATAL (OUTPATIENT)
Dept: OBGYN CLINIC | Age: 27
End: 2022-09-14

## 2022-09-14 VITALS
WEIGHT: 219.2 LBS | DIASTOLIC BLOOD PRESSURE: 78 MMHG | BODY MASS INDEX: 40.09 KG/M2 | SYSTOLIC BLOOD PRESSURE: 128 MMHG | HEART RATE: 97 BPM

## 2022-09-14 DIAGNOSIS — O26.893 RH NEGATIVE STATUS DURING PREGNANCY IN THIRD TRIMESTER: ICD-10-CM

## 2022-09-14 DIAGNOSIS — Z67.91 RH NEGATIVE STATUS DURING PREGNANCY IN THIRD TRIMESTER: ICD-10-CM

## 2022-09-14 DIAGNOSIS — Z87.59 HISTORY OF HEMOLYSIS, ELEVATED LIVER ENZYMES, AND LOW PLATELET (HELLP) SYNDROME: ICD-10-CM

## 2022-09-14 DIAGNOSIS — Z34.83 PRENATAL CARE, SUBSEQUENT PREGNANCY IN THIRD TRIMESTER: Primary | ICD-10-CM

## 2022-09-14 DIAGNOSIS — I10 CHRONIC HYPERTENSION: ICD-10-CM

## 2022-09-14 DIAGNOSIS — Z98.891 HISTORY OF C-SECTION: ICD-10-CM

## 2022-09-14 PROCEDURE — 0502F SUBSEQUENT PRENATAL CARE: CPT | Performed by: OBSTETRICS & GYNECOLOGY

## 2022-09-18 PROBLEM — Z34.83 PRENATAL CARE, SUBSEQUENT PREGNANCY IN THIRD TRIMESTER: Status: ACTIVE | Noted: 2022-07-10

## 2022-09-19 NOTE — PROGRESS NOTES
27 y/o  female at 28 weeks 3 days gestation with [facility] 22 presents for prenatal visit  Pregnancy is complicated by low transverse  on 13 secondary to HELLP, severe pre-eclampsia and recent UTI---treated with macrobid. Medical history is positive for chronic HTN--takes nifedipine 30mg daily. Took lisinopril prior to pregnancy. Would like to resume lisinopril postpartum--intends to breastfeed--limited medication passes through breast milk  Home blood pressures: 120's/-80's  Surgical history is positive for C/Section, surgery to tailbone x 6 and emergency appendectomy (ruptured) in 2019. Interested in repeat --scheduled for 22. Seen by M --[facility] is in network. Advised to increase blood pressure medication to Q 12 hours--has not increased yet due to stability of blood pressures  Blood work was performed--antiphospholipid antibody testing is negative. Sees primary care at [facility] in [facility]. Declines NIPT testing. Denies vaginal bleeding, loss of fluid, contractions, pelvic pain. Admits to fetal movement. Denies headache and RUQ pain. Has issues with vision--distance only  Has had COVID-19 since last visit--fever, shortness of breath have resolved and improved respectively. Admits to baseline shortness of breath and constipation. Denies fever, chills, chest pain, nausea, vomiting, diarrhea, dysuria and hematuria. Maternal Wellness Questionnaire reviewed--no concerns. Diagnosis Orders   1. Prenatal care, subsequent pregnancy in third trimester        2. History of hemolysis, elevated liver enzymes, and low platelet (HELLP) syndrome        3. History of         4. Chronic hypertension        5. Rh negative status during pregnancy in third trimester          Continue prenatal vitamin daily  Increase nifedipine 30mg to twice daily dosing.   Continue 81 mg ASA daily  Home blood pressures  Growth scan every 4 weeks  Twice weekly  testing at 32 weeks  Follow up prn and 2 weeks for prenatal visit and CMP, CBC, and uric acid.   Schedule repeat  at next visit--antibiotics and Lovenox/LMWH postoperatively

## 2022-09-27 ENCOUNTER — OFFICE VISIT (OUTPATIENT)
Dept: OBGYN CLINIC | Age: 27
End: 2022-09-27
Payer: COMMERCIAL

## 2022-09-27 ENCOUNTER — ROUTINE PRENATAL (OUTPATIENT)
Dept: OBGYN CLINIC | Age: 27
End: 2022-09-27

## 2022-09-27 VITALS
DIASTOLIC BLOOD PRESSURE: 70 MMHG | HEART RATE: 94 BPM | SYSTOLIC BLOOD PRESSURE: 120 MMHG | WEIGHT: 221.2 LBS | BODY MASS INDEX: 40.46 KG/M2

## 2022-09-27 DIAGNOSIS — I10 CHRONIC HYPERTENSION: ICD-10-CM

## 2022-09-27 DIAGNOSIS — Z34.83 PRENATAL CARE, SUBSEQUENT PREGNANCY IN THIRD TRIMESTER: ICD-10-CM

## 2022-09-27 DIAGNOSIS — O26.893 RH NEGATIVE STATUS DURING PREGNANCY IN THIRD TRIMESTER: ICD-10-CM

## 2022-09-27 DIAGNOSIS — Z98.891 HISTORY OF C-SECTION: ICD-10-CM

## 2022-09-27 DIAGNOSIS — O99.211 MATERNAL OBESITY SYNDROME IN FIRST TRIMESTER: ICD-10-CM

## 2022-09-27 DIAGNOSIS — Z87.59 HISTORY OF HEMOLYSIS, ELEVATED LIVER ENZYMES, AND LOW PLATELET (HELLP) SYNDROME: Primary | ICD-10-CM

## 2022-09-27 DIAGNOSIS — Z67.91 RH NEGATIVE STATUS DURING PREGNANCY IN THIRD TRIMESTER: ICD-10-CM

## 2022-09-27 DIAGNOSIS — Z34.83 PRENATAL CARE, SUBSEQUENT PREGNANCY IN THIRD TRIMESTER: Primary | ICD-10-CM

## 2022-09-27 PROCEDURE — 0502F SUBSEQUENT PRENATAL CARE: CPT | Performed by: OBSTETRICS & GYNECOLOGY

## 2022-09-27 PROCEDURE — 76816 OB US FOLLOW-UP PER FETUS: CPT | Performed by: OBSTETRICS & GYNECOLOGY

## 2022-09-27 NOTE — PROGRESS NOTES
Temp 97.5 F   infared     Maternal emotional well being screening form completed and reviewed with patient. Current score is 1. Patient given referral to [facility] ([phone]):  No

## 2022-10-01 NOTE — PROGRESS NOTES
29 y/o  female at 30 weeks 2 days gestation with [facility] 22 presents for prenatal visit and growth ultrasound  Pregnancy is complicated by low transverse  on 13 secondary to HELLP, severe pre-eclampsia, COVID-19 in second trimester, and recent UTI---treated with macrobid. Medical history is positive for chronic HTN--takes nifedipine 30mg daily. Took lisinopril prior to pregnancy. Would like to resume lisinopril postpartum--intends to breastfeed--limited medication passes through breast milk  Home blood pressures: 100-130's/80-90's  Surgical history is positive for C/Section, surgery to tailbone x 6 and emergency appendectomy (ruptured) in 2019. Interested in repeat --scheduled for 22. Seen by [facility] --[facility] is in network. Advised to increase blood pressure medication to Q 12 hours--has not increased yet due to stability of blood pressures  Blood work was performed--antiphospholipid antibody testing is negative. Sees primary care at [facility] in [facility]. Declines NIPT testing. Denies vaginal bleeding, loss of fluid, contractions, pelvic pain. Admits to fetal movement. Denies headache and RUQ pain. Has issues with vision--distance only  Admits to baseline shortness of breath and constipation (loose stools recently)   Denies fever, chills, chest pain, nausea, vomiting, diarrhea, dysuria and hematuria. Maternal Wellness Questionnaire reviewed--no concerns. OBSTETRIC ULTRASOUND GROWTH    DATE: 2022    PHYSICIAN: KRISTIN Barrett D.O.     SONOGRAPHER: Kassi Cordero RDMS    INDICATION: Growth,     TYPE OF SCAN: abdominal    FINDINGS:  A single viable intrauterine pregnancy is noted in cephalic presentation. Cardiac and somatic activity are noted.     The following values were obtained:   Fetal heart rate    152 bpm   BPD      7.63cm  39.3 %   Head Circumference    27.94cm  16.1 %    Abdominal Circumference   27.31cm  70.4 %   Femur Length     6.00cm  54.5 %   Amniotic fluid index    8.95cm   EFW      1718g  58.8 percentile    Amniotic fluid volume is normal. Based on sonographic criteria the estimated fetal age is 31 weeks and 0 days with EDC of 2022. There is a 4 day discordance with the established EDC of 2022. The patient has a posterior placenta that is adequate distance in relation to the internal cervical os. The evaluation of the lower uterine segment and cervix reveals normal appearing anatomy. The uterus is unremarkable/gravid. Maternal ovaries and adnexae are not well visualized due to the size of the uterus and patient's gravid state. Anatomy seen includes: heart, stomach, kidneys, bladder    IMPRESSION:  Single live IUP in the third trimester. Adequate interval fetal growth. Imaging is limited secondary to fetal position. The patient is well aware of the limitations of ultrasound in the detection of anomalies. Diagnosis Orders   1. History of hemolysis, elevated liver enzymes, and low platelet (HELLP) syndrome  Lactate Dehydrogenase    Uric Acid    CBC with Auto Differential    Comprehensive Metabolic Panel    PROTEIN, URINE, 24 HOUR    CREATININE CLEARANCE, URINE, 24 HOUR      2. Prenatal care, subsequent pregnancy in third trimester  Lactate Dehydrogenase    Uric Acid    CBC with Auto Differential    Comprehensive Metabolic Panel    PROTEIN, URINE, 24 HOUR    CREATININE CLEARANCE, URINE, 24 HOUR      3. Rh negative status during pregnancy in third trimester        4. Chronic hypertension        5. History of           Orders Placed This Encounter   Procedures    Lactate Dehydrogenase    Uric Acid    CBC with Auto Differential    Comprehensive Metabolic Panel    PROTEIN, URINE, 24 HOUR    CREATININE CLEARANCE, URINE, 24 HOUR     Continue prenatal vitamin daily  Continue nifedipine 30mg to twice daily dosing.   Continue 81 mg ASA daily  Home blood pressures  Growth scan every 4 weeks  Once weekly  testing at 28 weeks--BPP   scheduled for 22--antibiotics and Lovenox/LMWH postoperatively

## 2022-10-03 ENCOUNTER — NURSE ONLY (OUTPATIENT)
Dept: OBGYN CLINIC | Age: 27
End: 2022-10-03
Payer: COMMERCIAL

## 2022-10-03 DIAGNOSIS — Z34.83 PRENATAL CARE, SUBSEQUENT PREGNANCY IN THIRD TRIMESTER: ICD-10-CM

## 2022-10-03 DIAGNOSIS — Z87.59 HISTORY OF HEMOLYSIS, ELEVATED LIVER ENZYMES, AND LOW PLATELET (HELLP) SYNDROME: ICD-10-CM

## 2022-10-03 PROCEDURE — 36415 COLL VENOUS BLD VENIPUNCTURE: CPT | Performed by: OBSTETRICS & GYNECOLOGY

## 2022-10-04 LAB
24HR URINE VOLUME (ML): 1100 ML
A/G RATIO: 1.5 (ref 1.1–2.2)
ALBUMIN SERPL-MCNC: 3.7 G/DL (ref 3.4–5)
ALP BLD-CCNC: 98 U/L (ref 40–129)
ALT SERPL-CCNC: 31 U/L (ref 10–40)
ANION GAP SERPL CALCULATED.3IONS-SCNC: 15 MMOL/L (ref 3–16)
AST SERPL-CCNC: 17 U/L (ref 15–37)
BASOPHILS ABSOLUTE: 0 K/UL (ref 0–0.2)
BASOPHILS RELATIVE PERCENT: 0.4 %
BILIRUB SERPL-MCNC: <0.2 MG/DL (ref 0–1)
BUN BLDV-MCNC: 7 MG/DL (ref 7–20)
CALCIUM SERPL-MCNC: 9 MG/DL (ref 8.3–10.6)
CHLORIDE BLD-SCNC: 104 MMOL/L (ref 99–110)
CO2: 21 MMOL/L (ref 21–32)
CREAT SERPL-MCNC: <0.5 MG/DL (ref 0.6–1.1)
CREAT SERPL-MCNC: <0.5 MG/DL (ref 0.6–1.2)
CREATININE 24 HOUR URINE: 1.4 G/24HR (ref 0.6–1.5)
CREATININE CLEARANCE: 174 ML/MIN (ref 88–128)
EOSINOPHILS ABSOLUTE: 0.1 K/UL (ref 0–0.6)
EOSINOPHILS RELATIVE PERCENT: 1.1 %
GFR AFRICAN AMERICAN: >60
GFR NON-AFRICAN AMERICAN: >60
GLUCOSE BLD-MCNC: 102 MG/DL (ref 70–99)
HCT VFR BLD CALC: 35.6 % (ref 36–48)
HEMOGLOBIN: 11.7 G/DL (ref 12–16)
LACTATE DEHYDROGENASE: 179 U/L (ref 100–190)
LYMPHOCYTES ABSOLUTE: 1.4 K/UL (ref 1–5.1)
LYMPHOCYTES RELATIVE PERCENT: 16.3 %
Lab: 24 HR
MCH RBC QN AUTO: 30.7 PG (ref 26–34)
MCHC RBC AUTO-ENTMCNC: 32.8 G/DL (ref 31–36)
MCV RBC AUTO: 93.7 FL (ref 80–100)
MONOCYTES ABSOLUTE: 0.7 K/UL (ref 0–1.3)
MONOCYTES RELATIVE PERCENT: 8.9 %
NEUTROPHILS ABSOLUTE: 6.2 K/UL (ref 1.7–7.7)
NEUTROPHILS RELATIVE PERCENT: 73.3 %
PDW BLD-RTO: 13.4 % (ref 12.4–15.4)
PLATELET # BLD: 239 K/UL (ref 135–450)
PMV BLD AUTO: 7.8 FL (ref 5–10.5)
POTASSIUM SERPL-SCNC: 4.7 MMOL/L (ref 3.5–5.1)
PROTEIN 24 HOUR URINE: 0.23 G/24HR
RBC # BLD: 3.8 M/UL (ref 4–5.2)
SODIUM BLD-SCNC: 140 MMOL/L (ref 136–145)
TOTAL PROTEIN: 6.2 G/DL (ref 6.4–8.2)
URIC ACID, SERUM: 3.3 MG/DL (ref 2.6–6)
WBC # BLD: 8.4 K/UL (ref 4–11)

## 2022-10-07 ENCOUNTER — TELEPHONE (OUTPATIENT)
Dept: OBGYN CLINIC | Age: 27
End: 2022-10-07

## 2022-10-07 NOTE — TELEPHONE ENCOUNTER
Aware -- send to triage w/ concerning symptoms   ALH
Patient calling d/t she is having vaginal spotting this morning. She noticed some blood on her panty liner this morning. Last vaginal intercourse was last week. She is not having any contractions just some cramping under her belly. She is having positive fetal movement. Informed patient this can be normal at 31w5d and may be her mucase plug. Patient is going to monitor her symptoms, if she is bleeding an filling a pad per hour, having pain at 10/10 that she can't walk or talk through she will report to L&D. Patient is going to be on pelvic rest till she is cleared at her next prenatal visit on 10/13/2022. She will call back in 1 hour with an update. Routing to Dr. Bill Manrique and provider on call Dr. Ric Tamayo.
25-Feb-2020 10:44

## 2022-10-13 ENCOUNTER — ROUTINE PRENATAL (OUTPATIENT)
Dept: OBGYN CLINIC | Age: 27
End: 2022-10-13
Payer: COMMERCIAL

## 2022-10-13 ENCOUNTER — OFFICE VISIT (OUTPATIENT)
Dept: OBGYN CLINIC | Age: 27
End: 2022-10-13
Payer: COMMERCIAL

## 2022-10-13 VITALS
WEIGHT: 223 LBS | SYSTOLIC BLOOD PRESSURE: 124 MMHG | HEART RATE: 114 BPM | BODY MASS INDEX: 40.79 KG/M2 | DIASTOLIC BLOOD PRESSURE: 90 MMHG

## 2022-10-13 DIAGNOSIS — I10 CHRONIC HYPERTENSION: Primary | ICD-10-CM

## 2022-10-13 DIAGNOSIS — Z98.891 HISTORY OF C-SECTION: ICD-10-CM

## 2022-10-13 DIAGNOSIS — Z87.59 HISTORY OF HEMOLYSIS, ELEVATED LIVER ENZYMES, AND LOW PLATELET (HELLP) SYNDROME: ICD-10-CM

## 2022-10-13 DIAGNOSIS — Z34.83 PRENATAL CARE, SUBSEQUENT PREGNANCY IN THIRD TRIMESTER: Primary | ICD-10-CM

## 2022-10-13 DIAGNOSIS — O26.893 RH NEGATIVE STATUS DURING PREGNANCY IN THIRD TRIMESTER: ICD-10-CM

## 2022-10-13 DIAGNOSIS — Z67.91 RH NEGATIVE STATUS DURING PREGNANCY IN THIRD TRIMESTER: ICD-10-CM

## 2022-10-13 DIAGNOSIS — I10 CHRONIC HYPERTENSION: ICD-10-CM

## 2022-10-13 PROCEDURE — 36415 COLL VENOUS BLD VENIPUNCTURE: CPT | Performed by: OBSTETRICS & GYNECOLOGY

## 2022-10-13 PROCEDURE — 0502F SUBSEQUENT PRENATAL CARE: CPT | Performed by: OBSTETRICS & GYNECOLOGY

## 2022-10-13 PROCEDURE — 76819 FETAL BIOPHYS PROFIL W/O NST: CPT | Performed by: OBSTETRICS & GYNECOLOGY

## 2022-10-13 NOTE — PROGRESS NOTES
Return OB Office Visit    CC:   Chief Complaint   Patient presents with    Routine Prenatal Visit       HPI:  Pt seen and examined. No concerns/complaints. Denies VB, LOF, ctx. +FM. Occasional HA, come and go. No blurry vision, chest pain, SOB. Denies RUQ pain. BP logs reviewed, very mild range diastolic, systolic normal. Procardia XL 30mg BID. Maternal wellness questionnaire reviewed - no concerns today. Score 3. Objective:  BP (!) 124/90   Pulse (!) 114   Wt 223 lb (101.2 kg)   LMP 2022   BMI 40.79 kg/m²   Gen: AO, NAD  Abd: Soft, NT  FHT: 14    Assessment/Plan:  28 y.o.  at 32w4d (Estimated Date of Delivery: 22) presents for JOSÉ appointment:      Diagnosis Orders   1. Prenatal care, subsequent pregnancy in third trimester        2. Rh negative status during pregnancy in third trimester        3. Chronic hypertension  Lactate Dehydrogenase    Uric Acid    Comprehensive Metabolic Panel    CBC with Auto Differential    Protein / creatinine ratio, urine      4. History of         5.  History of hemolysis, elevated liver enzymes, and low platelet (HELLP) syndrome          Doing well today, routine care  - FWB reassuring on BPP  - continue ANFS  - : 22   - h/o HELLP, initial BP today severe range but repeat mild, pt asymtpomatic, HELLP labs sent, return precautions reveiwed, continue home BP monitoring  - Rh negative    Dispo: RTC in 1 week  Ru Benavidez MD

## 2022-10-13 NOTE — PROGRESS NOTES
Maternal emotional well being screening form completed and reviewed with patient. Current score is 3. Patient given referral to [address] [place] [address] ([phone]):  No

## 2022-10-14 LAB
A/G RATIO: 1.4 (ref 1.1–2.2)
ALBUMIN SERPL-MCNC: 3.7 G/DL (ref 3.4–5)
ALP BLD-CCNC: 110 U/L (ref 40–129)
ALT SERPL-CCNC: 23 U/L (ref 10–40)
ANION GAP SERPL CALCULATED.3IONS-SCNC: 18 MMOL/L (ref 3–16)
AST SERPL-CCNC: 16 U/L (ref 15–37)
BASOPHILS ABSOLUTE: 0.1 K/UL (ref 0–0.2)
BASOPHILS RELATIVE PERCENT: 0.6 %
BILIRUB SERPL-MCNC: <0.2 MG/DL (ref 0–1)
BUN BLDV-MCNC: 10 MG/DL (ref 7–20)
CALCIUM SERPL-MCNC: 9.4 MG/DL (ref 8.3–10.6)
CHLORIDE BLD-SCNC: 103 MMOL/L (ref 99–110)
CO2: 20 MMOL/L (ref 21–32)
CREAT SERPL-MCNC: <0.5 MG/DL (ref 0.6–1.1)
CREATININE URINE: 89.6 MG/DL (ref 28–259)
EOSINOPHILS ABSOLUTE: 0.1 K/UL (ref 0–0.6)
EOSINOPHILS RELATIVE PERCENT: 0.9 %
GFR AFRICAN AMERICAN: >60
GFR NON-AFRICAN AMERICAN: >60
GLUCOSE BLD-MCNC: 85 MG/DL (ref 70–99)
HCT VFR BLD CALC: 33 % (ref 36–48)
HEMOGLOBIN: 11.3 G/DL (ref 12–16)
LACTATE DEHYDROGENASE: 195 U/L (ref 100–190)
LYMPHOCYTES ABSOLUTE: 1.6 K/UL (ref 1–5.1)
LYMPHOCYTES RELATIVE PERCENT: 16.6 %
MCH RBC QN AUTO: 30.7 PG (ref 26–34)
MCHC RBC AUTO-ENTMCNC: 34.2 G/DL (ref 31–36)
MCV RBC AUTO: 89.9 FL (ref 80–100)
MONOCYTES ABSOLUTE: 0.9 K/UL (ref 0–1.3)
MONOCYTES RELATIVE PERCENT: 9.1 %
NEUTROPHILS ABSOLUTE: 6.9 K/UL (ref 1.7–7.7)
NEUTROPHILS RELATIVE PERCENT: 72.8 %
PDW BLD-RTO: 12.8 % (ref 12.4–15.4)
PLATELET # BLD: 214 K/UL (ref 135–450)
PMV BLD AUTO: 7.9 FL (ref 5–10.5)
POTASSIUM SERPL-SCNC: 4.6 MMOL/L (ref 3.5–5.1)
PROTEIN PROTEIN: 18 MG/DL
PROTEIN/CREAT RATIO: 0.2 MG/DL
RBC # BLD: 3.67 M/UL (ref 4–5.2)
SODIUM BLD-SCNC: 141 MMOL/L (ref 136–145)
TOTAL PROTEIN: 6.4 G/DL (ref 6.4–8.2)
URIC ACID, SERUM: 3.6 MG/DL (ref 2.6–6)
WBC # BLD: 9.5 K/UL (ref 4–11)

## 2022-10-18 ENCOUNTER — OFFICE VISIT (OUTPATIENT)
Dept: OBGYN CLINIC | Age: 27
End: 2022-10-18
Payer: COMMERCIAL

## 2022-10-18 ENCOUNTER — ROUTINE PRENATAL (OUTPATIENT)
Dept: OBGYN CLINIC | Age: 27
End: 2022-10-18

## 2022-10-18 VITALS
WEIGHT: 223.6 LBS | HEART RATE: 111 BPM | SYSTOLIC BLOOD PRESSURE: 110 MMHG | BODY MASS INDEX: 40.9 KG/M2 | DIASTOLIC BLOOD PRESSURE: 82 MMHG

## 2022-10-18 DIAGNOSIS — O99.211 MATERNAL OBESITY SYNDROME IN FIRST TRIMESTER: ICD-10-CM

## 2022-10-18 DIAGNOSIS — O26.893 RH NEGATIVE STATUS DURING PREGNANCY IN THIRD TRIMESTER: ICD-10-CM

## 2022-10-18 DIAGNOSIS — Z67.91 RH NEGATIVE STATUS DURING PREGNANCY IN THIRD TRIMESTER: ICD-10-CM

## 2022-10-18 DIAGNOSIS — Z34.83 PRENATAL CARE, SUBSEQUENT PREGNANCY IN THIRD TRIMESTER: Primary | ICD-10-CM

## 2022-10-18 DIAGNOSIS — Z98.891 HISTORY OF C-SECTION: ICD-10-CM

## 2022-10-18 DIAGNOSIS — I10 CHRONIC HYPERTENSION: ICD-10-CM

## 2022-10-18 DIAGNOSIS — Z87.59 HISTORY OF HEMOLYSIS, ELEVATED LIVER ENZYMES, AND LOW PLATELET (HELLP) SYNDROME: ICD-10-CM

## 2022-10-18 PROCEDURE — 0502F SUBSEQUENT PRENATAL CARE: CPT | Performed by: OBSTETRICS & GYNECOLOGY

## 2022-10-18 PROCEDURE — 76819 FETAL BIOPHYS PROFIL W/O NST: CPT | Performed by: OBSTETRICS & GYNECOLOGY

## 2022-10-18 NOTE — PROGRESS NOTES
Temp 98 F  infared     Maternal emotional well being screening form completed and reviewed with patient. Current score is 0. Patient given referral to [facility] ([phone]):  No

## 2022-10-19 NOTE — PROGRESS NOTES
29 y/o  female at 33 weeks 2 days gestation with [facility] 22 presents for prenatal visit and fetal monitoring  Pregnancy is complicated by low transverse  on 13 secondary to HELLP, severe pre-eclampsia, COVID-19 in second trimester, and recent UTI---treated with macrobid. Medical history is positive for chronic HTN--takes nifedipine 30mg daily. Took lisinopril prior to pregnancy. Would like to resume lisinopril postpartum--intends to breastfeed--limited medication passes through breast milk  Home blood pressures: 110-130's/80-90's  Surgical history is positive for C/Section, surgery to tailbone x 6 and emergency appendectomy (ruptured) in 2019. Interested in repeat --scheduled for 22. Seen by M --[facility] is in network. Blood work was performed--antiphospholipid antibody testing is negative. Sees primary care at [facility] in [facility]. Declines NIPT testing. Denies vaginal bleeding, loss of fluid, contractions, pelvic pain. Admits to fetal movement. Admits to occasional mild headaches. Denies RUQ pain. Has issues with vision--distance only  Admits to some nausea, baseline shortness of breath and constipation (loose stools recently)   Denies fever, chills, chest pain, nausea, vomiting, diarrhea, dysuria and hematuria. Maternal Wellness Questionnaire reviewed--no concerns. OB ULTRASOUND:  BIOPHYSICAL PROFILE    DATE: 10/18/22    PHYSICIAN: KRISTIN Barrett D.O.    SONOGRAPHER: REENA Liu RDMS    INDICATION: Fetal well being    TYPE OF SCAN: abdominal    BPP: 8/8  Tone: 2, Gross fetal movement: 2, Breathin, Fluid: 2    FINDINGS:  A single viable intrauterine pregnancy is noted in cephalic presentation. Cardiac and somatic activity are noted. The following values were obtained:   Fetal heart rate 142 bpm   Amniotic fluid index 12.71cm    IMPRESSION:   Single live IUP in the third trimester. BPP 8/8. BHAVNA 12.71cm.     Imaging is limited secondary to fetal position. The patient is well aware of the limitations of ultrasound in the detection of anomalies. Diagnosis Orders   1. Prenatal care, subsequent pregnancy in third trimester        2. History of hemolysis, elevated liver enzymes, and low platelet (HELLP) syndrome        3. History of         4. Rh negative status during pregnancy in third trimester        5. Chronic hypertension          Continue prenatal vitamin daily  Continue nifedipine 30mg to twice daily dosing. Continue 81 mg ASA daily  Home blood pressures  Growth scan every 4 weeks  Once weekly  testing at 28 weeks--BPP   scheduled for 22--antibiotics and Lovenox/LMWH postoperatively  Follow up prn and 1 week for prenatal visit and fetal monitoring.

## 2022-10-24 ENCOUNTER — OFFICE VISIT (OUTPATIENT)
Dept: OBGYN CLINIC | Age: 27
End: 2022-10-24
Payer: COMMERCIAL

## 2022-10-24 ENCOUNTER — ROUTINE PRENATAL (OUTPATIENT)
Dept: OBGYN CLINIC | Age: 27
End: 2022-10-24

## 2022-10-24 VITALS
HEART RATE: 115 BPM | WEIGHT: 222.8 LBS | DIASTOLIC BLOOD PRESSURE: 82 MMHG | BODY MASS INDEX: 40.75 KG/M2 | SYSTOLIC BLOOD PRESSURE: 110 MMHG

## 2022-10-24 DIAGNOSIS — Z87.59 HISTORY OF HEMOLYSIS, ELEVATED LIVER ENZYMES, AND LOW PLATELET (HELLP) SYNDROME: ICD-10-CM

## 2022-10-24 DIAGNOSIS — I10 CHRONIC HYPERTENSION: ICD-10-CM

## 2022-10-24 DIAGNOSIS — Z34.83 PRENATAL CARE, SUBSEQUENT PREGNANCY IN THIRD TRIMESTER: Primary | ICD-10-CM

## 2022-10-24 DIAGNOSIS — Z67.91 RH NEGATIVE STATUS DURING PREGNANCY IN THIRD TRIMESTER: ICD-10-CM

## 2022-10-24 DIAGNOSIS — O26.893 RH NEGATIVE STATUS DURING PREGNANCY IN THIRD TRIMESTER: ICD-10-CM

## 2022-10-24 DIAGNOSIS — Z98.891 HISTORY OF C-SECTION: ICD-10-CM

## 2022-10-24 PROCEDURE — 76816 OB US FOLLOW-UP PER FETUS: CPT | Performed by: OBSTETRICS & GYNECOLOGY

## 2022-10-24 PROCEDURE — 0502F SUBSEQUENT PRENATAL CARE: CPT | Performed by: OBSTETRICS & GYNECOLOGY

## 2022-10-24 NOTE — PROGRESS NOTES
TEMP 98.3 F   infared     Maternal emotional well being screening form completed and reviewed with patient. Current score is 3.    Patient given referral to [address] [place] [address] ([phone]):

## 2022-10-25 NOTE — PROGRESS NOTES
27 y/o  female at 34 weeks 1 day gestation with [facility] 22 presents for prenatal visit and growth ultrasound  Pregnancy is complicated by low transverse  on 13 secondary to HELLP, severe pre-eclampsia, COVID-19 in second trimester, and recent UTI---treated with macrobid. Medical history is positive for chronic HTN--takes nifedipine 30mg twice daily. Took lisinopril prior to pregnancy. Would like to resume lisinopril postpartum--intends to breastfeed--limited medication passes through breast milk  Home blood pressures: 110-120's/80-90's  Surgical history is positive for C/Section, surgery to tailbone x 6 and emergency appendectomy (ruptured) in 2019. Interested in repeat --scheduled for 22. Seen by M --[facility] is in network. Blood work was performed--antiphospholipid antibody testing is negative. Sees primary care at [facility] in [facility]. Declines NIPT testing. Denies vaginal bleeding, loss of fluid, contractions, pelvic pain. Admits to fetal movement. Denies headache and RUQ pain. Has issues with vision--distance only  Admits to baseline shortness of breath and constipation (loose stools recently)   Denies fever, chills, chest pain, nausea, vomiting, diarrhea, dysuria and hematuria. Maternal Wellness Questionnaire reviewed--no concerns. OBSTETRIC ULTRASOUND GROWTH    DATE: 10/24/22    PHYSICIAN: KRISTIN Barrett D.O.     SONOGRAPHER: Damari Mcleod RDMS    INDICATION: Growth, BPP    TYPE OF SCAN: abdominal    FINDINGS:  A single viable intrauterine pregnancy is noted in cephalic presentation. Cardiac and somatic activity are noted.     The following values were obtained:   Fetal heart rate    143 bpm   BPD      8.47cm   47.3 %   Head Circumference    31.43cm  41.6 %    Abdominal Circumference   30.88cm  73.6 %   Femur Length     6.97cm   81.1 %   Amniotic fluid index    13.82cm   EFW      2580g   70.5 percentile    Amniotic fluid volume is normal. Based on sonographic criteria the estimated fetal age is 35 weeks and 0 days with EDC of 22. There is a 6 day discordance with the established EDC of 22. The patient has a right lateral placenta that is adequate distance in relation to the internal cervical os. The evaluation of the lower uterine segment and cervix reveals normal appearing anatomy. The uterus is unremarkable/gravid. Maternal ovaries and adnexae are not well visualized due to the size of the uterus and patient's gravid state. Anatomy seen includes: heart, stomach, kidneys, bladder    IMPRESSION:  Single live IUP in the third trimester. Adequate interval fetal growth. BPP . Imaging is limited secondary to fetal position. The patient is well aware of the limitations of ultrasound in the detection of anomalies. Diagnosis Orders   1. Prenatal care, subsequent pregnancy in third trimester        2. History of hemolysis, elevated liver enzymes, and low platelet (HELLP) syndrome        3. History of         4. Chronic hypertension        5. Rh negative status during pregnancy in third trimester          Labor precautions, kick counts  Continue prenatal vitamin daily  Continue nifedipine 30mg twice daily.   Continue 81 mg ASA daily  Home blood pressures  Growth scan every 4 weeks  Once weekly BPP   scheduled for 22--antibiotics and Lovenox/LMWH postoperatively

## 2022-10-31 ENCOUNTER — TELEPHONE (OUTPATIENT)
Dept: OBGYN CLINIC | Age: 27
End: 2022-10-31

## 2022-10-31 NOTE — TELEPHONE ENCOUNTER
Labor precautions as above. Please make sure patient is hydrating and resting. Report to L&D according to symptoms.    Thanks

## 2022-10-31 NOTE — TELEPHONE ENCOUNTER
Pt states 35 weeks pregnant. Pt states having pressure and back pain. Pt states \"she is head down\". Pt states no bleeding or gushes of fluid. Pt states pain comes and goes the highest 8/10 but when she sit still pain goes down to a 4/10. Pt states fetal movement is normal. Pt encouraged to go to Triage if pain becomes 10/10 unable to perform daily activities. Pt encouraged to go to Triage if any gushes of fluid/bleeding. Pt aware to give us a call back if needing to go to Triage so we could make them aware. Please advise.

## 2022-10-31 NOTE — TELEPHONE ENCOUNTER
Pt aware of [facility] response. Pt states she went home early to rest and pt states she has been hydrating. Pt given bleeding and pain precautions.  DONE

## 2022-11-01 ENCOUNTER — HOSPITAL ENCOUNTER (OUTPATIENT)
Age: 27
Discharge: HOME | End: 2022-11-01
Payer: COMMERCIAL

## 2022-11-01 ENCOUNTER — ROUTINE PRENATAL (OUTPATIENT)
Dept: OBGYN CLINIC | Age: 27
End: 2022-11-01

## 2022-11-01 ENCOUNTER — OFFICE VISIT (OUTPATIENT)
Dept: OBGYN CLINIC | Age: 27
End: 2022-11-01
Payer: COMMERCIAL

## 2022-11-01 VITALS
WEIGHT: 223.4 LBS | SYSTOLIC BLOOD PRESSURE: 122 MMHG | BODY MASS INDEX: 40.86 KG/M2 | DIASTOLIC BLOOD PRESSURE: 88 MMHG | HEART RATE: 93 BPM

## 2022-11-01 DIAGNOSIS — Z87.59 HISTORY OF HEMOLYSIS, ELEVATED LIVER ENZYMES, AND LOW PLATELET (HELLP) SYNDROME: ICD-10-CM

## 2022-11-01 DIAGNOSIS — O26.893 RH NEGATIVE STATUS DURING PREGNANCY IN THIRD TRIMESTER: ICD-10-CM

## 2022-11-01 DIAGNOSIS — Z34.83 PRENATAL CARE, SUBSEQUENT PREGNANCY IN THIRD TRIMESTER: ICD-10-CM

## 2022-11-01 DIAGNOSIS — I10 CHRONIC HYPERTENSION: ICD-10-CM

## 2022-11-01 DIAGNOSIS — Z34.83 PRENATAL CARE, SUBSEQUENT PREGNANCY IN THIRD TRIMESTER: Primary | ICD-10-CM

## 2022-11-01 DIAGNOSIS — Z98.891 HISTORY OF C-SECTION: ICD-10-CM

## 2022-11-01 DIAGNOSIS — Z67.91 RH NEGATIVE STATUS DURING PREGNANCY IN THIRD TRIMESTER: ICD-10-CM

## 2022-11-01 LAB
A/G RATIO: 1.2 (ref 1.1–2.2)
ALBUMIN SERPL-MCNC: 3.7 G/DL (ref 3.4–5)
ALP BLD-CCNC: 146 U/L (ref 40–129)
ALT SERPL-CCNC: 35 U/L (ref 10–40)
ANION GAP SERPL CALCULATED.3IONS-SCNC: 10 MMOL/L (ref 3–16)
APTT: 23.7 SEC (ref 23–34.3)
AST SERPL-CCNC: 22 U/L (ref 15–37)
BASOPHILS ABSOLUTE: 0.1 K/UL (ref 0–0.2)
BASOPHILS RELATIVE PERCENT: 0.8 %
BILIRUB SERPL-MCNC: <0.2 MG/DL (ref 0–1)
BUN BLDV-MCNC: 7 MG/DL (ref 7–20)
CALCIUM SERPL-MCNC: 9.1 MG/DL (ref 8.3–10.6)
CHLORIDE BLD-SCNC: 101 MMOL/L (ref 99–110)
CO2: 20 MMOL/L (ref 21–32)
CREAT SERPL-MCNC: <0.5 MG/DL (ref 0.6–1.1)
EOSINOPHILS ABSOLUTE: 0 K/UL (ref 0–0.6)
EOSINOPHILS RELATIVE PERCENT: 0.4 %
FIBRINOGEN: 607 MG/DL (ref 207–509)
GFR SERPL CREATININE-BSD FRML MDRD: >60 ML/MIN/{1.73_M2}
GLUCOSE BLD-MCNC: 75 MG/DL (ref 70–99)
HCT VFR BLD CALC: 33.7 % (ref 36–48)
HEMOGLOBIN: 11.9 G/DL (ref 12–16)
INR BLD: 0.95 (ref 0.87–1.14)
LACTATE DEHYDROGENASE: 253 U/L (ref 100–190)
LYMPHOCYTES ABSOLUTE: 1.7 K/UL (ref 1–5.1)
LYMPHOCYTES RELATIVE PERCENT: 13.7 %
MCH RBC QN AUTO: 29.5 PG (ref 26–34)
MCHC RBC AUTO-ENTMCNC: 35.2 G/DL (ref 31–36)
MCV RBC AUTO: 83.6 FL (ref 80–100)
MONOCYTES ABSOLUTE: 0.8 K/UL (ref 0–1.3)
MONOCYTES RELATIVE PERCENT: 7 %
NEUTROPHILS ABSOLUTE: 9.4 K/UL (ref 1.7–7.7)
NEUTROPHILS RELATIVE PERCENT: 78.1 %
PDW BLD-RTO: 12.4 % (ref 12.4–15.4)
PLATELET # BLD: 249 K/UL (ref 135–450)
PMV BLD AUTO: 8.6 FL (ref 5–10.5)
POTASSIUM SERPL-SCNC: 3.9 MMOL/L (ref 3.5–5.1)
PROTHROMBIN TIME: 12.6 SEC (ref 11.7–14.5)
RBC # BLD: 4.03 M/UL (ref 4–5.2)
SODIUM BLD-SCNC: 131 MMOL/L (ref 136–145)
TOTAL PROTEIN: 6.7 G/DL (ref 6.4–8.2)
URIC ACID, SERUM: 3.9 MG/DL (ref 2.6–6)
WBC # BLD: 12.1 K/UL (ref 4–11)

## 2022-11-01 PROCEDURE — 80053 COMPREHEN METABOLIC PANEL: CPT

## 2022-11-01 PROCEDURE — 76819 FETAL BIOPHYS PROFIL W/O NST: CPT | Performed by: OBSTETRICS & GYNECOLOGY

## 2022-11-01 PROCEDURE — G8428 CUR MEDS NOT DOCUMENT: HCPCS | Performed by: OBSTETRICS & GYNECOLOGY

## 2022-11-01 PROCEDURE — G8484 FLU IMMUNIZE NO ADMIN: HCPCS | Performed by: OBSTETRICS & GYNECOLOGY

## 2022-11-01 PROCEDURE — 36415 COLL VENOUS BLD VENIPUNCTURE: CPT

## 2022-11-01 PROCEDURE — 84550 ASSAY OF BLOOD/URIC ACID: CPT

## 2022-11-01 PROCEDURE — 1036F TOBACCO NON-USER: CPT | Performed by: OBSTETRICS & GYNECOLOGY

## 2022-11-01 PROCEDURE — 85025 COMPLETE CBC W/AUTO DIFF WBC: CPT

## 2022-11-01 PROCEDURE — 3074F SYST BP LT 130 MM HG: CPT | Performed by: OBSTETRICS & GYNECOLOGY

## 2022-11-01 PROCEDURE — G8417 CALC BMI ABV UP PARAM F/U: HCPCS | Performed by: OBSTETRICS & GYNECOLOGY

## 2022-11-01 PROCEDURE — 0502F SUBSEQUENT PRENATAL CARE: CPT | Performed by: OBSTETRICS & GYNECOLOGY

## 2022-11-01 PROCEDURE — 85730 THROMBOPLASTIN TIME PARTIAL: CPT

## 2022-11-01 PROCEDURE — 83615 LACTATE (LD) (LDH) ENZYME: CPT

## 2022-11-01 PROCEDURE — 85610 PROTHROMBIN TIME: CPT

## 2022-11-01 PROCEDURE — 3078F DIAST BP <80 MM HG: CPT | Performed by: OBSTETRICS & GYNECOLOGY

## 2022-11-01 PROCEDURE — 85384 FIBRINOGEN ACTIVITY: CPT

## 2022-11-01 NOTE — PROGRESS NOTES
Maternal emotional well being screening form completed and reviewed with patient.  Current score is 3

## 2022-11-04 LAB — GROUP B STREP CULTURE: NORMAL

## 2022-11-04 NOTE — PROGRESS NOTES
29 y/o  female at 35 weeks 2 days gestation with [facility] 22 presents for prenatal visit and fetal monitoring. Pregnancy is complicated by low transverse  on 13 secondary to HELLP, severe pre-eclampsia, COVID-19 in second trimester, and recent UTI---treated with macrobid. Medical history is positive for chronic HTN--takes nifedipine 30mg twice daily. Took lisinopril prior to pregnancy. Would like to resume lisinopril postpartum--intends to breastfeed--limited medication passes through breast milk  Home blood pressures: 110-130's/80-90's  Surgical history is positive for C/Section, surgery to tailbone x 6 and emergency appendectomy (ruptured) in 2019. Interested in repeat --scheduled for 22. Seen by M --[facility] is in network. Blood work was performed--antiphospholipid antibody testing is negative. Sees primary care at [facility] in [facility]. Declines NIPT testing. Denies vaginal bleeding, contractions, pelvic pain. Noted some vaginal leakage (2 episodes--small amount yesterday). Has worn pad and has not noted any further signs of SROM. Admits to fetal movement. Has noted pelvic pressure  Denies headache and RUQ pain. Has issues with vision--distance only  Admits to baseline shortness of breath and constipation   Denies fever, chills, chest pain, nausea, vomiting, diarrhea, dysuria and hematuria. Maternal Wellness Questionnaire reviewed--no concerns. /-3 vertex  Signs of yeast appreciated--treat prn OTC yeast medication  Negative ferning. OB ULTRASOUND:  BIOPHYSICAL PROFILE    DATE: 2022    PHYSICIAN: KRISTIN Barrett D.O.    SONOGRAPHER: Luisa Clancy RDMS    INDICATION: Fetal well being    TYPE OF SCAN: abdominal    BPP: 8/8  Tone: 2, Gross fetal movement: 2, Breathin, Fluid: 2    FINDINGS:  A single viable intrauterine pregnancy is noted in cephalic presentation. Cardiac and somatic activity are noted.      The following values were obtained:   Fetal heart rate 158bpm   Amniotic fluid index 18.59cm    IMPRESSION:   Single live IUP in the third trimester. BPP 8/8. BHAVNA 18.59cm. Imaging is limited secondary to fetal position. The patient is well aware of the limitations of ultrasound in the detection of anomalies. Diagnosis Orders   1. Prenatal care, subsequent pregnancy in third trimester  Culture, Strep B Screen, Vaginal/Rectal    Comprehensive Metabolic Panel    CBC with Auto Differential    Fibrinogen    Lactate Dehydrogenase    Protime/INR & PTT    Uric Acid      2. History of hemolysis, elevated liver enzymes, and low platelet (HELLP) syndrome  Comprehensive Metabolic Panel    CBC with Auto Differential    Fibrinogen    Lactate Dehydrogenase    Protime/INR & PTT    Uric Acid      3. Rh negative status during pregnancy in third trimester        4. History of         5. Chronic hypertension          Orders Placed This Encounter   Procedures    Culture, Strep B Screen, Vaginal/Rectal    Comprehensive Metabolic Panel    CBC with Auto Differential    Fibrinogen    Lactate Dehydrogenase    Protime/INR & PTT    Uric Acid     Approximately 20 minutes spent in counseling and coordination of care with over 50% in direct face to face counseling. Await test results  Pre-eclampsia precautions  Rx terazol  Continue prenatal vitamin daily  Continue nifedipine 30mg twice daily. Continue 81 mg ASA daily  Home blood pressures  Growth scan every 4 weeks  Once weekly BPP   scheduled for 22--antibiotics and Lovenox/LMWH postoperatively  Labor precautions, kick counts  Follow up prn and 1 week for prenatal visit and fetal monitoring.

## 2022-11-08 ENCOUNTER — OFFICE VISIT (OUTPATIENT)
Dept: OBGYN CLINIC | Age: 27
End: 2022-11-08
Payer: COMMERCIAL

## 2022-11-08 ENCOUNTER — ROUTINE PRENATAL (OUTPATIENT)
Dept: OBGYN CLINIC | Age: 27
End: 2022-11-08

## 2022-11-08 VITALS
DIASTOLIC BLOOD PRESSURE: 76 MMHG | HEART RATE: 102 BPM | BODY MASS INDEX: 41.52 KG/M2 | WEIGHT: 227 LBS | SYSTOLIC BLOOD PRESSURE: 124 MMHG

## 2022-11-08 DIAGNOSIS — I10 CHRONIC HYPERTENSION: ICD-10-CM

## 2022-11-08 DIAGNOSIS — Z87.59 HISTORY OF HEMOLYSIS, ELEVATED LIVER ENZYMES, AND LOW PLATELET (HELLP) SYNDROME: ICD-10-CM

## 2022-11-08 DIAGNOSIS — O26.893 RH NEGATIVE STATUS DURING PREGNANCY IN THIRD TRIMESTER: ICD-10-CM

## 2022-11-08 DIAGNOSIS — Z34.83 PRENATAL CARE, SUBSEQUENT PREGNANCY IN THIRD TRIMESTER: Primary | ICD-10-CM

## 2022-11-08 DIAGNOSIS — Z98.891 HISTORY OF C-SECTION: ICD-10-CM

## 2022-11-08 DIAGNOSIS — Z67.91 RH NEGATIVE STATUS DURING PREGNANCY IN THIRD TRIMESTER: ICD-10-CM

## 2022-11-08 PROCEDURE — 99999 PR OFFICE/OUTPT VISIT,PROCEDURE ONLY: CPT | Performed by: OBSTETRICS & GYNECOLOGY

## 2022-11-08 PROCEDURE — 0502F SUBSEQUENT PRENATAL CARE: CPT | Performed by: OBSTETRICS & GYNECOLOGY

## 2022-11-08 PROCEDURE — 76819 FETAL BIOPHYS PROFIL W/O NST: CPT | Performed by: OBSTETRICS & GYNECOLOGY

## 2022-11-08 NOTE — PROGRESS NOTES
Temp 98.0 F   infared     Maternal emotional well being screening form completed and reviewed with patient. Current score is 0. Patient given referral to [facility] ([phone]):  No

## 2022-11-09 NOTE — PROGRESS NOTES
27 y/o  female at 36 weeks 2 days gestation with [facility] 22 presents for prenatal visit and fetal monitoring. Pregnancy is complicated by low transverse  on 13 secondary to HELLP, severe pre-eclampsia, COVID-19 in second trimester, and recent UTI---treated with macrobid. Medical history is positive for chronic HTN--takes nifedipine 30mg twice daily. Took lisinopril prior to pregnancy. Would like to resume lisinopril postpartum--intends to breastfeed--limited medication passes through breast milk  Home blood pressures: 110-130's/80-90's-stable  Pre-eclampsia labs normal on 22  Surgical history is positive for C/Section, surgery to tailbone x 6 and emergency appendectomy (ruptured) in 2019. Interested in repeat --scheduled for 22. Seen by M --[facility] is in network. Blood work was performed--antiphospholipid antibody testing is negative. Sees primary care at [facility] in [facility]. Declines NIPT testing. Denies vaginal bleeding, contractions, pelvic pain, and regular contractions. Admits to fetal movement. Vaginal discharge/symptoms have resolved since treatment with 3 day cream for yeast vaginitis   Denies headache and RUQ pain. Has issues with vision--distance only  Admits to baseline shortness of breath and constipation   Denies fever, chills, chest pain, nausea, vomiting, diarrhea, dysuria and hematuria. Maternal Wellness Questionnaire reviewed--no concerns. OB ULTRASOUND:  BIOPHYSICAL PROFILE    DATE: 22    PHYSICIAN: KRISTIN Barrett D.O.    SONOGRAPHER: Stuart Knott RDMS    INDICATION: Fetal well being    TYPE OF SCAN: abdominal    BPP:   Tone: 2, Gross fetal movement: 2, Breathin, Fluid: 2    FINDINGS:  A single viable intrauterine pregnancy is noted in cephalic presentation. Cardiac and somatic activity are noted.      The following values were obtained:   Fetal heart rate 140 bpm   Amniotic fluid index 12.58cm    IMPRESSION:   Single live IUP in the third trimester. BPP /. BHAVNA 12.58cm. Imaging is limited secondary to fetal position. The patient is well aware of the limitations of ultrasound in the detection of anomalies. Diagnosis Orders   1. Prenatal care, subsequent pregnancy in third trimester        2. Rh negative status during pregnancy in third trimester        3. History of         4. History of hemolysis, elevated liver enzymes, and low platelet (HELLP) syndrome        5. Chronic hypertension          Pre-eclampsia precautions  Continue prenatal vitamins  Continue nifedipine 30 mg twice daily  Home blood pressures  Growth scan every 4 weeks  Weekly BPP   scheduled for 22--antibiotics and Lovenox/LMWH postoperatively  Labor precautions, kick counts  Follow up prn and 1 week for prenatal visit and fetal monitoring.

## 2022-11-15 ENCOUNTER — OFFICE VISIT (OUTPATIENT)
Dept: OBGYN CLINIC | Age: 27
End: 2022-11-15
Payer: COMMERCIAL

## 2022-11-15 ENCOUNTER — ROUTINE PRENATAL (OUTPATIENT)
Dept: OBGYN CLINIC | Age: 27
End: 2022-11-15
Payer: COMMERCIAL

## 2022-11-15 VITALS
BODY MASS INDEX: 41.34 KG/M2 | WEIGHT: 226 LBS | DIASTOLIC BLOOD PRESSURE: 96 MMHG | HEART RATE: 97 BPM | SYSTOLIC BLOOD PRESSURE: 130 MMHG

## 2022-11-15 DIAGNOSIS — O14.93 PRE-ECLAMPSIA IN THIRD TRIMESTER: ICD-10-CM

## 2022-11-15 DIAGNOSIS — I10 CHRONIC HYPERTENSION: ICD-10-CM

## 2022-11-15 DIAGNOSIS — Z67.91 RH NEGATIVE STATUS DURING PREGNANCY IN THIRD TRIMESTER: ICD-10-CM

## 2022-11-15 DIAGNOSIS — O26.893 RH NEGATIVE STATUS DURING PREGNANCY IN THIRD TRIMESTER: ICD-10-CM

## 2022-11-15 DIAGNOSIS — Z98.891 HISTORY OF C-SECTION: ICD-10-CM

## 2022-11-15 DIAGNOSIS — Z34.83 PRENATAL CARE, SUBSEQUENT PREGNANCY IN THIRD TRIMESTER: Primary | ICD-10-CM

## 2022-11-15 DIAGNOSIS — Z87.59 HISTORY OF HEMOLYSIS, ELEVATED LIVER ENZYMES, AND LOW PLATELET (HELLP) SYNDROME: ICD-10-CM

## 2022-11-15 LAB
BASOPHILS ABSOLUTE: 0.1 K/UL (ref 0–0.2)
BASOPHILS RELATIVE PERCENT: 0.7 %
EOSINOPHILS ABSOLUTE: 0.1 K/UL (ref 0–0.6)
EOSINOPHILS RELATIVE PERCENT: 0.7 %
HCT VFR BLD CALC: 37.6 % (ref 36–48)
HEMOGLOBIN: 12.7 G/DL (ref 12–16)
LYMPHOCYTES ABSOLUTE: 1.5 K/UL (ref 1–5.1)
LYMPHOCYTES RELATIVE PERCENT: 16.7 %
MCH RBC QN AUTO: 29.5 PG (ref 26–34)
MCHC RBC AUTO-ENTMCNC: 33.7 G/DL (ref 31–36)
MCV RBC AUTO: 87.5 FL (ref 80–100)
MONOCYTES ABSOLUTE: 0.7 K/UL (ref 0–1.3)
MONOCYTES RELATIVE PERCENT: 7.3 %
NEUTROPHILS ABSOLUTE: 6.9 K/UL (ref 1.7–7.7)
NEUTROPHILS RELATIVE PERCENT: 74.6 %
PDW BLD-RTO: 12.6 % (ref 12.4–15.4)
PLATELET # BLD: 227 K/UL (ref 135–450)
PMV BLD AUTO: 9.2 FL (ref 5–10.5)
RBC # BLD: 4.29 M/UL (ref 4–5.2)
WBC # BLD: 9.3 K/UL (ref 4–11)

## 2022-11-15 PROCEDURE — 0502F SUBSEQUENT PRENATAL CARE: CPT | Performed by: OBSTETRICS & GYNECOLOGY

## 2022-11-15 PROCEDURE — 36415 COLL VENOUS BLD VENIPUNCTURE: CPT | Performed by: OBSTETRICS & GYNECOLOGY

## 2022-11-15 PROCEDURE — 76819 FETAL BIOPHYS PROFIL W/O NST: CPT | Performed by: OBSTETRICS & GYNECOLOGY

## 2022-11-16 ENCOUNTER — ANESTHESIA EVENT (OUTPATIENT)
Dept: LABOR AND DELIVERY | Age: 27
End: 2022-11-16
Payer: COMMERCIAL

## 2022-11-16 ENCOUNTER — HOSPITAL ENCOUNTER (INPATIENT)
Age: 27
LOS: 2 days | Discharge: HOME | End: 2022-11-18
Attending: OBSTETRICS & GYNECOLOGY | Admitting: OBSTETRICS & GYNECOLOGY
Payer: COMMERCIAL

## 2022-11-16 ENCOUNTER — ANESTHESIA (OUTPATIENT)
Dept: LABOR AND DELIVERY | Age: 27
End: 2022-11-16
Payer: COMMERCIAL

## 2022-11-16 DIAGNOSIS — Z98.891 S/P REPEAT LOW TRANSVERSE C-SECTION: ICD-10-CM

## 2022-11-16 PROBLEM — O14.93 PRE-ECLAMPSIA IN THIRD TRIMESTER: Status: ACTIVE | Noted: 2022-11-16

## 2022-11-16 LAB
A/G RATIO: 1.1 (ref 1.1–2.2)
A/G RATIO: 1.2 (ref 1.1–2.2)
ABO/RH: NORMAL
ALBUMIN SERPL-MCNC: 3.6 G/DL (ref 3.4–5)
ALBUMIN SERPL-MCNC: 3.6 G/DL (ref 3.4–5)
ALP BLD-CCNC: 165 U/L (ref 40–129)
ALP BLD-CCNC: 180 U/L (ref 40–129)
ALT SERPL-CCNC: 26 U/L (ref 10–40)
ALT SERPL-CCNC: 28 U/L (ref 10–40)
AMPHETAMINE SCREEN, URINE: NORMAL
ANION GAP SERPL CALCULATED.3IONS-SCNC: 13 MMOL/L (ref 3–16)
ANION GAP SERPL CALCULATED.3IONS-SCNC: 17 MMOL/L (ref 3–16)
ANTIBODY SCREEN: NORMAL
AST SERPL-CCNC: 15 U/L (ref 15–37)
AST SERPL-CCNC: 17 U/L (ref 15–37)
BARBITURATE SCREEN URINE: NORMAL
BENZODIAZEPINE SCREEN, URINE: NORMAL
BILIRUB SERPL-MCNC: <0.2 MG/DL (ref 0–1)
BILIRUB SERPL-MCNC: <0.2 MG/DL (ref 0–1)
BUN BLDV-MCNC: 7 MG/DL (ref 7–20)
BUN BLDV-MCNC: 9 MG/DL (ref 7–20)
BUPRENORPHINE URINE: NORMAL
CALCIUM SERPL-MCNC: 9.2 MG/DL (ref 8.3–10.6)
CALCIUM SERPL-MCNC: 9.3 MG/DL (ref 8.3–10.6)
CANNABINOID SCREEN URINE: NORMAL
CHLORIDE BLD-SCNC: 103 MMOL/L (ref 99–110)
CHLORIDE BLD-SCNC: 105 MMOL/L (ref 99–110)
CO2: 17 MMOL/L (ref 21–32)
CO2: 20 MMOL/L (ref 21–32)
COCAINE METABOLITE SCREEN URINE: NORMAL
CREAT SERPL-MCNC: <0.5 MG/DL (ref 0.6–1.1)
CREAT SERPL-MCNC: <0.5 MG/DL (ref 0.6–1.1)
FENTANYL SCREEN, URINE: NORMAL
FIBRINOGEN: 574 MG/DL (ref 207–509)
GFR SERPL CREATININE-BSD FRML MDRD: >60 ML/MIN/{1.73_M2}
GFR SERPL CREATININE-BSD FRML MDRD: >60 ML/MIN/{1.73_M2}
GLUCOSE BLD-MCNC: 68 MG/DL (ref 70–99)
GLUCOSE BLD-MCNC: 89 MG/DL (ref 70–99)
HCT VFR BLD CALC: 37.3 % (ref 36–48)
HEMOGLOBIN: 12.6 G/DL (ref 12–16)
INR BLD: 0.98 (ref 0.87–1.14)
LACTATE DEHYDROGENASE: 207 U/L (ref 100–190)
LACTATE DEHYDROGENASE: 299 U/L (ref 100–190)
Lab: NORMAL
MCH RBC QN AUTO: 28.9 PG (ref 26–34)
MCHC RBC AUTO-ENTMCNC: 33.8 G/DL (ref 31–36)
MCV RBC AUTO: 85.6 FL (ref 80–100)
METHADONE SCREEN, URINE: NORMAL
OPIATE SCREEN URINE: NORMAL
OXYCODONE URINE: NORMAL
PDW BLD-RTO: 12.8 % (ref 12.4–15.4)
PH UA: 6
PHENCYCLIDINE SCREEN URINE: NORMAL
PLATELET # BLD: 227 K/UL (ref 135–450)
PMV BLD AUTO: 8.1 FL (ref 5–10.5)
POTASSIUM SERPL-SCNC: 3.9 MMOL/L (ref 3.5–5.1)
POTASSIUM SERPL-SCNC: 4.3 MMOL/L (ref 3.5–5.1)
PROTHROMBIN TIME: 12.9 SEC (ref 11.7–14.5)
RBC # BLD: 4.36 M/UL (ref 4–5.2)
SODIUM BLD-SCNC: 137 MMOL/L (ref 136–145)
SODIUM BLD-SCNC: 138 MMOL/L (ref 136–145)
TOTAL PROTEIN: 6.5 G/DL (ref 6.4–8.2)
TOTAL PROTEIN: 7 G/DL (ref 6.4–8.2)
URIC ACID, SERUM: 3.9 MG/DL (ref 2.6–6)
URIC ACID, SERUM: 4.4 MG/DL (ref 2.6–6)
WBC # BLD: 10.9 K/UL (ref 4–11)

## 2022-11-16 PROCEDURE — 88305 TISSUE EXAM BY PATHOLOGIST: CPT

## 2022-11-16 PROCEDURE — 3700000000 HC ANESTHESIA ATTENDED CARE: Performed by: OBSTETRICS & GYNECOLOGY

## 2022-11-16 PROCEDURE — 3609079900 HC CESAREAN SECTION: Performed by: OBSTETRICS & GYNECOLOGY

## 2022-11-16 PROCEDURE — 84550 ASSAY OF BLOOD/URIC ACID: CPT

## 2022-11-16 PROCEDURE — 6360000002 HC RX W HCPCS: Performed by: OBSTETRICS & GYNECOLOGY

## 2022-11-16 PROCEDURE — 86901 BLOOD TYPING SEROLOGIC RH(D): CPT

## 2022-11-16 PROCEDURE — 83615 LACTATE (LD) (LDH) ENZYME: CPT

## 2022-11-16 PROCEDURE — 7100000000 HC PACU RECOVERY - FIRST 15 MIN: Performed by: OBSTETRICS & GYNECOLOGY

## 2022-11-16 PROCEDURE — 85384 FIBRINOGEN ACTIVITY: CPT

## 2022-11-16 PROCEDURE — 86850 RBC ANTIBODY SCREEN: CPT

## 2022-11-16 PROCEDURE — 3700000001 HC ADD 15 MINUTES (ANESTHESIA): Performed by: OBSTETRICS & GYNECOLOGY

## 2022-11-16 PROCEDURE — 7100000001 HC PACU RECOVERY - ADDTL 15 MIN: Performed by: OBSTETRICS & GYNECOLOGY

## 2022-11-16 PROCEDURE — 2709999900 HC NON-CHARGEABLE SUPPLY: Performed by: OBSTETRICS & GYNECOLOGY

## 2022-11-16 PROCEDURE — 2580000003 HC RX 258: Performed by: OBSTETRICS & GYNECOLOGY

## 2022-11-16 PROCEDURE — 86900 BLOOD TYPING SEROLOGIC ABO: CPT

## 2022-11-16 PROCEDURE — 80053 COMPREHEN METABOLIC PANEL: CPT

## 2022-11-16 PROCEDURE — 6370000000 HC RX 637 (ALT 250 FOR IP): Performed by: OBSTETRICS & GYNECOLOGY

## 2022-11-16 PROCEDURE — 1220000000 HC SEMI PRIVATE OB R&B

## 2022-11-16 PROCEDURE — 85610 PROTHROMBIN TIME: CPT

## 2022-11-16 PROCEDURE — 99222 1ST HOSP IP/OBS MODERATE 55: CPT | Performed by: OBSTETRICS & GYNECOLOGY

## 2022-11-16 PROCEDURE — 85027 COMPLETE CBC AUTOMATED: CPT

## 2022-11-16 PROCEDURE — 2500000003 HC RX 250 WO HCPCS: Performed by: NURSE ANESTHETIST, CERTIFIED REGISTERED

## 2022-11-16 PROCEDURE — 86780 TREPONEMA PALLIDUM: CPT

## 2022-11-16 PROCEDURE — 2500000003 HC RX 250 WO HCPCS: Performed by: OBSTETRICS & GYNECOLOGY

## 2022-11-16 PROCEDURE — 6360000002 HC RX W HCPCS: Performed by: NURSE ANESTHETIST, CERTIFIED REGISTERED

## 2022-11-16 PROCEDURE — 80307 DRUG TEST PRSMV CHEM ANLYZR: CPT

## 2022-11-16 RX ORDER — SODIUM CHLORIDE 0.9 % (FLUSH) 0.9 %
10 SYRINGE (ML) INJECTION EVERY 12 HOURS SCHEDULED
Status: DISCONTINUED | OUTPATIENT
Start: 2022-11-16 | End: 2022-11-16

## 2022-11-16 RX ORDER — ONDANSETRON 2 MG/ML
4 INJECTION INTRAMUSCULAR; INTRAVENOUS EVERY 6 HOURS PRN
Status: DISCONTINUED | OUTPATIENT
Start: 2022-11-16 | End: 2022-11-16

## 2022-11-16 RX ORDER — SODIUM CHLORIDE 9 MG/ML
INJECTION, SOLUTION INTRAVENOUS PRN
Status: DISCONTINUED | OUTPATIENT
Start: 2022-11-16 | End: 2022-11-16

## 2022-11-16 RX ORDER — TRISODIUM CITRATE DIHYDRATE AND CITRIC ACID MONOHYDRATE 500; 334 MG/5ML; MG/5ML
30 SOLUTION ORAL ONCE
Status: DISCONTINUED | OUTPATIENT
Start: 2022-11-16 | End: 2022-11-16

## 2022-11-16 RX ORDER — ONDANSETRON 2 MG/ML
INJECTION INTRAMUSCULAR; INTRAVENOUS PRN
Status: DISCONTINUED | OUTPATIENT
Start: 2022-11-16 | End: 2022-11-16 | Stop reason: SDUPTHER

## 2022-11-16 RX ORDER — EPHEDRINE SULFATE 50 MG/ML
INJECTION INTRAVENOUS PRN
Status: DISCONTINUED | OUTPATIENT
Start: 2022-11-16 | End: 2022-11-16 | Stop reason: SDUPTHER

## 2022-11-16 RX ORDER — SODIUM CHLORIDE 0.9 % (FLUSH) 0.9 %
5-40 SYRINGE (ML) INJECTION PRN
Status: DISCONTINUED | OUTPATIENT
Start: 2022-11-16 | End: 2022-11-18 | Stop reason: HOSPADM

## 2022-11-16 RX ORDER — FAMOTIDINE 10 MG/ML
INJECTION, SOLUTION INTRAVENOUS PRN
Status: DISCONTINUED | OUTPATIENT
Start: 2022-11-16 | End: 2022-11-16 | Stop reason: SDUPTHER

## 2022-11-16 RX ORDER — OXYCODONE HYDROCHLORIDE 5 MG/1
10 TABLET ORAL EVERY 6 HOURS PRN
Status: DISCONTINUED | OUTPATIENT
Start: 2022-11-16 | End: 2022-11-18 | Stop reason: HOSPADM

## 2022-11-16 RX ORDER — SODIUM CHLORIDE 0.9 % (FLUSH) 0.9 %
5-40 SYRINGE (ML) INJECTION PRN
Status: DISCONTINUED | OUTPATIENT
Start: 2022-11-16 | End: 2022-11-16

## 2022-11-16 RX ORDER — SODIUM CHLORIDE, SODIUM LACTATE, POTASSIUM CHLORIDE, CALCIUM CHLORIDE 600; 310; 30; 20 MG/100ML; MG/100ML; MG/100ML; MG/100ML
INJECTION, SOLUTION INTRAVENOUS CONTINUOUS
Status: DISCONTINUED | OUTPATIENT
Start: 2022-11-16 | End: 2022-11-16

## 2022-11-16 RX ORDER — ACETAMINOPHEN 500 MG
1000 TABLET ORAL EVERY 8 HOURS
Status: DISCONTINUED | OUTPATIENT
Start: 2022-11-16 | End: 2022-11-18 | Stop reason: HOSPADM

## 2022-11-16 RX ORDER — FERROUS SULFATE 325(65) MG
325 TABLET ORAL
Status: DISCONTINUED | OUTPATIENT
Start: 2022-11-17 | End: 2022-11-18 | Stop reason: HOSPADM

## 2022-11-16 RX ORDER — ONDANSETRON 2 MG/ML
4 INJECTION INTRAMUSCULAR; INTRAVENOUS EVERY 6 HOURS PRN
Status: DISCONTINUED | OUTPATIENT
Start: 2022-11-16 | End: 2022-11-18 | Stop reason: HOSPADM

## 2022-11-16 RX ORDER — SODIUM CHLORIDE 9 MG/ML
INJECTION, SOLUTION INTRAVENOUS PRN
Status: DISCONTINUED | OUTPATIENT
Start: 2022-11-16 | End: 2022-11-18 | Stop reason: HOSPADM

## 2022-11-16 RX ORDER — FAMOTIDINE 20 MG/1
20 TABLET, FILM COATED ORAL 2 TIMES DAILY
Status: DISCONTINUED | OUTPATIENT
Start: 2022-11-16 | End: 2022-11-18 | Stop reason: HOSPADM

## 2022-11-16 RX ORDER — FENTANYL CITRATE 50 UG/ML
INJECTION, SOLUTION INTRAMUSCULAR; INTRAVENOUS PRN
Status: DISCONTINUED | OUTPATIENT
Start: 2022-11-16 | End: 2022-11-16 | Stop reason: SDUPTHER

## 2022-11-16 RX ORDER — LANOLIN 100 %
OINTMENT (GRAM) TOPICAL
Status: DISCONTINUED | OUTPATIENT
Start: 2022-11-16 | End: 2022-11-18 | Stop reason: HOSPADM

## 2022-11-16 RX ORDER — SODIUM CHLORIDE, SODIUM LACTATE, POTASSIUM CHLORIDE, AND CALCIUM CHLORIDE .6; .31; .03; .02 G/100ML; G/100ML; G/100ML; G/100ML
1000 INJECTION, SOLUTION INTRAVENOUS ONCE
Status: COMPLETED | OUTPATIENT
Start: 2022-11-16 | End: 2022-11-16

## 2022-11-16 RX ORDER — PRENATAL WITH FERROUS FUM AND FOLIC ACID 3080; 920; 120; 400; 22; 1.84; 3; 20; 10; 1; 12; 200; 27; 25; 2 [IU]/1; [IU]/1; MG/1; [IU]/1; MG/1; MG/1; MG/1; MG/1; MG/1; MG/1; UG/1; MG/1; MG/1; MG/1; MG/1
1 TABLET ORAL DAILY
Status: DISCONTINUED | OUTPATIENT
Start: 2022-11-16 | End: 2022-11-18 | Stop reason: HOSPADM

## 2022-11-16 RX ORDER — SODIUM CHLORIDE 0.9 % (FLUSH) 0.9 %
5-40 SYRINGE (ML) INJECTION EVERY 12 HOURS SCHEDULED
Status: DISCONTINUED | OUTPATIENT
Start: 2022-11-16 | End: 2022-11-18 | Stop reason: HOSPADM

## 2022-11-16 RX ORDER — OXYTOCIN 10 [USP'U]/ML
INJECTION, SOLUTION INTRAMUSCULAR; INTRAVENOUS PRN
Status: DISCONTINUED | OUTPATIENT
Start: 2022-11-16 | End: 2022-11-16 | Stop reason: SDUPTHER

## 2022-11-16 RX ORDER — IBUPROFEN 800 MG/1
800 TABLET ORAL EVERY 8 HOURS
Status: DISCONTINUED | OUTPATIENT
Start: 2022-11-16 | End: 2022-11-18 | Stop reason: HOSPADM

## 2022-11-16 RX ORDER — SODIUM CHLORIDE 0.9 % (FLUSH) 0.9 %
10 SYRINGE (ML) INJECTION PRN
Status: DISCONTINUED | OUTPATIENT
Start: 2022-11-16 | End: 2022-11-16

## 2022-11-16 RX ORDER — SODIUM CHLORIDE 0.9 % (FLUSH) 0.9 %
5-40 SYRINGE (ML) INJECTION EVERY 12 HOURS SCHEDULED
Status: DISCONTINUED | OUTPATIENT
Start: 2022-11-16 | End: 2022-11-16

## 2022-11-16 RX ORDER — DOCUSATE SODIUM 100 MG/1
100 CAPSULE, LIQUID FILLED ORAL 2 TIMES DAILY
Status: DISCONTINUED | OUTPATIENT
Start: 2022-11-16 | End: 2022-11-18 | Stop reason: HOSPADM

## 2022-11-16 RX ORDER — LISINOPRIL 10 MG/1
10 TABLET ORAL DAILY
Status: DISCONTINUED | OUTPATIENT
Start: 2022-11-16 | End: 2022-11-18 | Stop reason: HOSPADM

## 2022-11-16 RX ORDER — FAMOTIDINE 10 MG/ML
INJECTION, SOLUTION INTRAVENOUS
Status: COMPLETED
Start: 2022-11-16 | End: 2022-11-16

## 2022-11-16 RX ORDER — DIPHENHYDRAMINE HYDROCHLORIDE 50 MG/ML
25 INJECTION INTRAMUSCULAR; INTRAVENOUS EVERY 6 HOURS PRN
Status: DISCONTINUED | OUTPATIENT
Start: 2022-11-16 | End: 2022-11-18 | Stop reason: HOSPADM

## 2022-11-16 RX ORDER — SODIUM CHLORIDE, SODIUM LACTATE, POTASSIUM CHLORIDE, CALCIUM CHLORIDE 600; 310; 30; 20 MG/100ML; MG/100ML; MG/100ML; MG/100ML
INJECTION, SOLUTION INTRAVENOUS CONTINUOUS
Status: DISCONTINUED | OUTPATIENT
Start: 2022-11-16 | End: 2022-11-18 | Stop reason: HOSPADM

## 2022-11-16 RX ORDER — OXYCODONE HYDROCHLORIDE 5 MG/1
5 TABLET ORAL EVERY 6 HOURS PRN
Status: DISCONTINUED | OUTPATIENT
Start: 2022-11-16 | End: 2022-11-18 | Stop reason: HOSPADM

## 2022-11-16 RX ORDER — BUPIVACAINE HYDROCHLORIDE 7.5 MG/ML
INJECTION, SOLUTION INTRASPINAL PRN
Status: DISCONTINUED | OUTPATIENT
Start: 2022-11-16 | End: 2022-11-16 | Stop reason: SDUPTHER

## 2022-11-16 RX ORDER — KETOROLAC TROMETHAMINE 30 MG/ML
30 INJECTION, SOLUTION INTRAMUSCULAR; INTRAVENOUS EVERY 6 HOURS PRN
Status: DISCONTINUED | OUTPATIENT
Start: 2022-11-16 | End: 2022-11-18 | Stop reason: HOSPADM

## 2022-11-16 RX ORDER — MORPHINE SULFATE 10 MG/ML
INJECTION, SOLUTION INTRAMUSCULAR; INTRAVENOUS PRN
Status: DISCONTINUED | OUTPATIENT
Start: 2022-11-16 | End: 2022-11-16 | Stop reason: SDUPTHER

## 2022-11-16 RX ADMIN — BUPIVACAINE HYDROCHLORIDE 1.6 ML: 7.5 INJECTION, SOLUTION SUBARACHNOID at 11:52

## 2022-11-16 RX ADMIN — LISINOPRIL 10 MG: 10 TABLET ORAL at 19:23

## 2022-11-16 RX ADMIN — SODIUM CHLORIDE, POTASSIUM CHLORIDE, SODIUM LACTATE AND CALCIUM CHLORIDE: 600; 310; 30; 20 INJECTION, SOLUTION INTRAVENOUS at 12:25

## 2022-11-16 RX ADMIN — MORPHINE SULFATE 0.15 MG: 10 INJECTION, SOLUTION INTRAMUSCULAR; INTRAVENOUS at 11:52

## 2022-11-16 RX ADMIN — KETOROLAC TROMETHAMINE 30 MG: 30 INJECTION, SOLUTION INTRAMUSCULAR at 18:15

## 2022-11-16 RX ADMIN — SODIUM CHLORIDE, POTASSIUM CHLORIDE, SODIUM LACTATE AND CALCIUM CHLORIDE: 600; 310; 30; 20 INJECTION, SOLUTION INTRAVENOUS at 13:58

## 2022-11-16 RX ADMIN — OXYTOCIN 20 UNITS: 10 INJECTION INTRAVENOUS at 12:31

## 2022-11-16 RX ADMIN — FENTANYL CITRATE 100 MCG: 50 INJECTION INTRAMUSCULAR; INTRAVENOUS at 12:32

## 2022-11-16 RX ADMIN — Medication 900 MG: at 11:30

## 2022-11-16 RX ADMIN — ONDANSETRON 4 MG: 2 INJECTION INTRAMUSCULAR; INTRAVENOUS at 11:30

## 2022-11-16 RX ADMIN — EPHEDRINE SULFATE 20 MG: 50 INJECTION INTRAVENOUS at 12:25

## 2022-11-16 RX ADMIN — FAMOTIDINE 20 MG: 10 INJECTION, SOLUTION INTRAVENOUS at 11:30

## 2022-11-16 RX ADMIN — SODIUM CHLORIDE, POTASSIUM CHLORIDE, SODIUM LACTATE AND CALCIUM CHLORIDE: 600; 310; 30; 20 INJECTION, SOLUTION INTRAVENOUS at 11:44

## 2022-11-16 RX ADMIN — MORPHINE SULFATE 2 MG: 10 INJECTION, SOLUTION INTRAMUSCULAR; INTRAVENOUS at 12:34

## 2022-11-16 RX ADMIN — SODIUM CHLORIDE, POTASSIUM CHLORIDE, SODIUM LACTATE AND CALCIUM CHLORIDE: 600; 310; 30; 20 INJECTION, SOLUTION INTRAVENOUS at 10:52

## 2022-11-16 RX ADMIN — GENTAMICIN SULFATE 500 MG: 40 INJECTION, SOLUTION INTRAMUSCULAR; INTRAVENOUS at 11:45

## 2022-11-16 RX ADMIN — SODIUM CHLORIDE, POTASSIUM CHLORIDE, SODIUM LACTATE AND CALCIUM CHLORIDE 1000 ML: 600; 310; 30; 20 INJECTION, SOLUTION INTRAVENOUS at 09:40

## 2022-11-16 RX ADMIN — DOCUSATE SODIUM 100 MG: 100 CAPSULE, LIQUID FILLED ORAL at 22:08

## 2022-11-16 ASSESSMENT — LIFESTYLE VARIABLES: SMOKING_STATUS: 1

## 2022-11-16 ASSESSMENT — PAIN SCALES - GENERAL: PAINLEVEL_OUTOF10: 3

## 2022-11-16 NOTE — ANESTHESIA PRE PROCEDURE
Department of Anesthesiology  Preprocedure Note       Name:  Cass Choudhury   Age:  28 y.o.  :  1995                                          MRN:  [id number]         Date:  2022      Surgeon: Ru Drake):  Gabi Barrett DO    Procedure: Procedure(s):   SECTION    Medications prior to admission:   Prior to Admission medications    Medication Sig Start Date End Date Taking?  Authorizing Provider   FIBER ADULT GUMMIES PO Take by mouth daily    Historical Provider, MD   aspirin 81 MG EC tablet Take 81 mg by mouth daily  Patient not taking: Reported on 11/15/2022    Historical Provider, MD   Prenatal MV-Min-Fe Fum-FA-DHA (PRENATAL 1 PO) Take by mouth    Historical Provider, MD   NIFEdipine (ADALAT CC) 30 MG extended release tablet Take 30 mg by mouth 2 times daily    Historical Provider, MD   LISINOPRIL PO Take by mouth  Patient not taking: Reported on 11/15/2022    Historical Provider, MD   Norethindrone (MER PO) Take by mouth  Patient not taking: Reported on 2022    Historical Provider, MD   cyclobenzaprine (FLEXERIL) 10 MG tablet Take 1 tablet by mouth 3 times daily as needed for Muscle spasms  Patient not taking: Reported on 2022   MATT Brennan       Current medications:    Current Facility-Administered Medications   Medication Dose Route Frequency Provider Last Rate Last Admin    lactated ringers infusion   IntraVENous Continuous Andrésa Lillian Fedreubenr,  mL/hr at 22 1144 New Bag at 22 1144    sodium chloride flush 0.9 % injection 10 mL  10 mL IntraVENous 2 times per day Andrésa Lillian Fedreubenr, DO        sodium chloride flush 0.9 % injection 10 mL  10 mL IntraVENous PRN Skyetta Lillian Federer, DO        0.9 % sodium chloride infusion   IntraVENous PRN Andrésa Bidemily Fedreubenr, DO        citric acid-sodium citrate (BICITRA) solution 30 mL  30 mL Oral Once Andrésa Lillian Fedreubenr, DO        ondansetron [facility]) injection 4 mg  4 mg IntraVENous Q6H PRN Perez Lux Federer, DO        oxytocin (PITOCIN) 30 units in 500 mL infusion  87.3 snehal-units/min IntraVENous Continuous PRN Perez Lamberer, DO        And    oxytocin (PITOCIN) 10 unit bolus from the bag  10 Units IntraVENous PRN Perez Lux Federer, DO        lactated ringers infusion   IntraVENous Continuous Perez Lux Federer, DO        sodium chloride flush 0.9 % injection 5-40 mL  5-40 mL IntraVENous 2 times per day Perez Lux Federer, DO        sodium chloride flush 0.9 % injection 5-40 mL  5-40 mL IntraVENous PRN Perez Lux Federer, DO        0.9 % sodium chloride infusion   IntraVENous PRN Perez Lux Federer, DO         Facility-Administered Medications Ordered in Other Encounters   Medication Dose Route Frequency Provider Last Rate Last Admin    ondansetron (ZOFRAN) injection   IntraVENous PRN Cathi Gunderson APRN - CRNA   4 mg at 22 1130    famotidine (PEPCID) injection   IntraVENous PRN Cathi Gunderson APRN - CRNA   20 mg at 22 1130    bupivacaine 0.75% in dextrose 8.25% (intrathecal) (SENSORCAINE) 0.75-8.25 % injection   Spinal/Regional PRN Cathi Gunderson APRN - CRNA   1.6 mL at 22 1152    morphine (PF) injection   Intrathecal PRN Cathi Gunderson APRN - CRNA   0.15 mg at 22 1152       Allergies:     Allergies   Allergen Reactions    Cefzil [Cefprozil] Hives and Nausea And Vomiting       Problem List:    Patient Active Problem List   Diagnosis Code    History of hemolysis, elevated liver enzymes, and low platelet (HELLP) syndrome Z87.59    Chronic hypertension I10    History of  Z98.891    Rh negative, maternal O26.899, Z67.91    Prenatal care, subsequent pregnancy in third trimester Z34.83    Pre-eclampsia in third trimester O14.93       Past Medical History:        Diagnosis Date    Chronic hypertension     Complication of anesthesia     wakes up \"frantic\" and needs meds to calm her down    Diabetes mellitus ([address]     borderline diabetic prior to pregnancy    Hx of migraines     Pre-eclampsia      delivery     Rh negative, maternal        Past Surgical History:        Procedure Laterality Date    ADENOIDECTOMY  age 2    APPENDECTOMY      BACK SURGERY      6 surgeries from age [age] related to pilonidal cyst removal on tailbone.   SECTION      TYMPANOPLASTY      repaired as a child       Social History:    Social History     Tobacco Use    Smoking status: Former     Types: Cigarettes    Smokeless tobacco: Never   Substance Use Topics    Alcohol use: No     Comment: occ                                Counseling given: Not Answered      Vital Signs (Current):   Vitals:    22 0855 22 0934   BP: (!) 134/92    Pulse: (!) 109    Resp: 16    Temp: 36.7 °C (98 °F)    TempSrc: Oral    Weight:  226 lb (102.5 kg)   Height:  5' 2\" (1.575 m)                                              BP Readings from Last 3 Encounters:   22 (!) 134/92   11/15/22 (!) 130/96   22 124/76       NPO Status: Time of last liquid consumption:                         Time of last solid consumption:                         Date of last liquid consumption: 11/15/22                        Date of last solid food consumption: 11/15/22    BMI:   Wt Readings from Last 3 Encounters:   22 226 lb (102.5 kg)   11/15/22 226 lb (102.5 kg)   22 227 lb (103 kg)     Body mass index is 41.34 kg/m².     CBC:   Lab Results   Component Value Date/Time    WBC 10.9 2022 09:30 AM    RBC 4.36 2022 09:30 AM    HGB 12.6 2022 09:30 AM    HCT 37.3 2022 09:30 AM    MCV 85.6 2022 09:30 AM    RDW 12.8 2022 09:30 AM     2022 09:30 AM       CMP:   Lab Results   Component Value Date/Time     2022 09:30 AM    K 3.9 2022 09:30 AM     2022 09:30 AM    CO2 20 2022 09:30 AM    BUN 9 2022 09:30 AM    CREATININE <0.5 2022 09:30 AM    GFRAA >60 10/13/2022 04:27 PM    AGRATIO 1.1 11/16/2022 09:30 AM    LABGLOM >60 11/16/2022 09:30 AM    GLUCOSE 89 11/16/2022 09:30 AM    PROT 7.0 11/16/2022 09:30 AM    CALCIUM 9.2 11/16/2022 09:30 AM    BILITOT <0.2 11/16/2022 09:30 AM    ALKPHOS 165 11/16/2022 09:30 AM    AST 15 11/16/2022 09:30 AM    ALT 26 11/16/2022 09:30 AM       POC Tests: No results for input(s): POCGLU, POCNA, POCK, POCCL, POCBUN, POCHEMO, POCHCT in the last 72 hours. Coags:   Lab Results   Component Value Date/Time    PROTIME 12.9 11/16/2022 09:30 AM    INR 0.98 11/16/2022 09:30 AM    APTT 23.7 11/01/2022 12:12 PM       HCG (If Applicable):   Lab Results   Component Value Date    PREGTESTUR Negative 07/11/2019        ABGs: No results found for: PHART, PO2ART, YLM4LEY, IOC0WFE, BEART, W6WHSNPZ     Type & Screen (If Applicable):  Lab Results   Component Value Date    LABABO A 11/05/2012    [address] Negative 11/05/2012       Drug/Infectious Status (If Applicable):  No results found for: HIV, HEPCAB    COVID-19 Screening (If Applicable): No results found for: COVID19        Anesthesia Evaluation  Patient summary reviewed and Nursing notes reviewed no history of anesthetic complications:   Airway: Mallampati: II  TM distance: >3 FB   Neck ROM: full  Mouth opening: > = 3 FB   Dental:          Pulmonary:   (+) current smoker (vaping)                           Cardiovascular:    (+) hypertension:,                   Neuro/Psych:   (+) headaches: migraine headaches,             GI/Hepatic/Renal:   (+) morbid obesity          Endo/Other:    (+) Diabetes, . Abdominal:   (+) obese,           Vascular: negative vascular ROS. Other Findings:           Anesthesia Plan      spinal     ASA 3 - emergent           MIPS: Postoperative opioids intended and Prophylactic antiemetics administered. Anesthetic plan and risks discussed with patient. Plan discussed with attending.           Post-op pain plan if not by surgeon: intrathecal narcotics      Discussed risks and benefits of SAB with Duramorph for post op pain control. She agrees with plan. Questions answered.       Krystian Story, APRN - CRNA   11/16/2022

## 2022-11-16 NOTE — PLAN OF CARE
Problem: Postpartum  Goal: Experiences normal postpartum course  Description:  Postpartum OB-Pregnancy care plan goal which identifies if the mother is experiencing a normal postpartum course  Outcome: Progressing  Goal: Appropriate maternal -  bonding  Description:  Postpartum OB-Pregnancy care plan goal which identifies if the mother and  are bonding appropriately  Outcome: Progressing  Goal: Incisions, wounds, or drain sites healing without S/S of infection  Outcome: Progressing     Problem: Pain  Goal: Verbalizes/displays adequate comfort level or baseline comfort level  Outcome: Progressing

## 2022-11-16 NOTE — H&P
Department of Obstetrics and Gynecology  Attending Obstetrics History and Physical        CHIEF COMPLAINT:  elevated blood pressure,     HISTORY OF PRESENT ILLNESS:      The patient is a 28 y.o.  2 parity 0101 at 40 weeks 3 days gestation with [facility] 22 who presents for repeat . Patient was seen in the office yesterday for routine prenatal visit. Has not felt well for the past several days. Home blood pressures have been 140-150's/'s. Patient has noted trend upward in blood pressures over the past week. Denies consistent headache, vision change and RUQ pain. Pre-eclampsia lab work has been reassuring and was noted to be stable with most recent testing (11/15/22). Due to patient history and blood pressure trend though, options were discussed and delivery via repeat  is recommended. Risks (including concerns for fetal lung maturity) and benefits were reviewed with patient and  who concur with the plan. Patient denies vaginal bleeding, loss of fluid, contractions, and decreased fetal movement. Pregnancy is complicated by low transverse  on 13 secondary to HELLP, severe pre-eclampsia, Rh negative, COVID-19 in second trimester, and recent UTI---treated with macrobid. Medical history is positive for chronic HTN--takes nifedipine 30mg twice daily. DATES:    Last Menstrual Period:  22  Estimated Due Date:  22    PRENATAL CARE:    Provider:  KRISTIN Barrett D.O.     Blood Type/Rh:  A negative  Antibody Screen:  negative  Rubella:  immune  RPR:  non-reactive  Hepatitis B Surface Antigen: non-reactive  HIV:  non-reactive  Gonorrhea:  negative  Chlamydia:  negative  MSAFP/Multiple Markers:  Date:  ; Results:    U/S Structural Survery:  see report  1 hour Glucose Tolerance Test:  /112  Group B Strep:  negative      PAST OB HISTORY        Depression:  No      Post-partum depression:  No      Diabetes:  No      Gestational Diabetes:  No Thyroid Disease:  No      Chronic HTN:  Yes       Gestation HTN:  Yes       Pre-eclampsia:  Yes       Seizure disorder:  No      Asthma:  No      Clotting disorder:  No      :  Yes x 1      Tubal ligation:  No      D & C:  No      Cerclage:  No      LEEP:  No      Myomectomy:  No    OB History    Para Term  AB Living   2 1   1   1   SAB IAB Ectopic Molar Multiple Live Births             1      # Outcome Date GA Lbr [place]/2nd Weight Sex Delivery Anes PTL Lv   2 Current            1  13 28w6d  2 lb 2.6 oz (0.98 kg) F CS-LTranv EPI Y DALTON      Birth Comments: severe preeclampsia/HELLP      Complications: Pre-eclampsia, Other (Comment), HELLP syndrome     Past Gynecological History:      Menarche:    Last menstrual period:  Patient's last menstrual period was 2022. History of uterine fibroids:  No  History of endometriosis:  No  Pap History:  Last PAP was normal; 2022. Sexually transmitted disease history: none    Past Medical History:        Diagnosis Date    Chronic hypertension     Complication of anesthesia     wakes up \"frantic\" and needs meds to calm her down    Diabetes mellitus ([address]     borderline diabetic prior to pregnancy    Hx of migraines     Pre-eclampsia      delivery     Rh negative, maternal      Past Surgical History:        Procedure Laterality Date    ADENOIDECTOMY  age 2    APPENDECTOMY      BACK SURGERY      6 surgeries from age [age] related to pilonidal cyst removal on tailbone.  SECTION      TYMPANOPLASTY      repaired as a child     Social History:    TOBACCO:   reports that she has quit smoking. Her smoking use included cigarettes. She has never used smokeless tobacco.  ETOH:   reports no history of alcohol use. DRUGS:   reports no history of drug use.   Family History:       Problem Relation Age of Onset    High Blood Pressure Mother     Cancer Mother     Arthritis Father     Asthma Father     High Blood Pressure Father     Diabetes Maternal Grandmother     Heart Disease Maternal Grandmother     Diabetes Maternal Grandfather     Cancer Paternal Grandmother     Diabetes Paternal Grandmother     High Blood Pressure Paternal Grandmother     Diabetes Paternal Grandfather     Heart Disease Paternal Grandfather     High Blood Pressure Paternal Grandfather      Medications Prior to Admission:  Medications Prior to Admission: [address] PO, Take by mouth daily  aspirin 81 MG EC tablet, Take 81 mg by mouth daily (Patient not taking: Reported on 11/15/2022)  Prenatal MV-Min-Fe Fum-FA-DHA (PRENATAL 1 PO), Take by mouth  NIFEdipine (ADALAT CC) 30 MG extended release tablet, Take 30 mg by mouth 2 times daily  LISINOPRIL PO, Take by mouth (Patient not taking: Reported on 11/15/2022)  Norethindrone (MER PO), Take by mouth (Patient not taking: Reported on 11/16/2022)  cyclobenzaprine (FLEXERIL) 10 MG tablet, Take 1 tablet by mouth 3 times daily as needed for Muscle spasms (Patient not taking: Reported on 11/16/2022)  Allergies:  Cefzil [cefprozil]    REVIEW OF SYSTEMS:    Patient has no history of depression.   Patient has no symptoms of depression  CONSTITUTIONAL:  negative for  fevers, chills, sweats, and fatigue  RESPIRATORY:  negative for  dry cough, cough with sputum, and dyspnea  CARDIOVASCULAR:  negative for  chest pain, dyspnea, palpitations  GASTROINTESTINAL:  positive for diarrhea, negative for nausea, vomiting, constipation, and abdominal pain  GENITOURINARY:  negative  INTEGUMENT/BREAST:  negative  ENDOCRINE:  negative  MUSCULOSKELETAL:  negative  NEUROLOGICAL:  negative for headaches and visual disturbance  BEHAVIOR/PSYCH:  negative    PHYSICAL EXAM:  Vitals:    11/16/22 0855 11/16/22 0934   BP: (!) 134/92    Pulse: (!) 109    Resp: 16    Temp: 98 °F (36.7 °C)    TempSrc: Oral    Weight:  226 lb (102.5 kg)   Height:  5' 2\" (1.575 m)       General appearance:  awake, alert, cooperative, no apparent distress, and appears stated age  Neurologic:  Mental Status Exam:  Level of Alertness:   awake  Orientation:   person, place, time  Memory:   normal  Fund of Knowledge:  normal  Attention/Concentration:  normal  Language:  normal  Lungs:  No increased work of breathing, good air exchange, clear to auscultation bilaterally, no crackles or wheezing  Heart:   normal S1 and S2,   Abdomen:  soft, non-distended, and non-tender  Fetal heart rate:  Baseline Heart Rate 130, accelerations:  present  long term variability:  moderate  decelerations:  absent    Contraction frequency:  15 minutes  Membranes:  Intact    Pelvic Ultrasound:      General Labs:  CBC:   Lab Results   Component Value Date/Time    WBC 9.3 11/15/2022 05:37 PM    RBC 4.29 11/15/2022 05:37 PM    HGB 12.7 11/15/2022 05:37 PM    HCT 37.6 11/15/2022 05:37 PM    MCV 87.5 11/15/2022 05:37 PM    RDW 12.6 11/15/2022 05:37 PM     11/15/2022 05:37 PM       ASSESSMENT AND PLAN:    The patient is a 28 y.o.  2 parity 0101 at 40 weeks 3 days gestation  Previous low transverse  on 13 secondary to HELLP  Pre-eclampsia superimposed on chronic HTN  Rh negative, COVID-19 in second trimester  Morbid obesity  Chronic HTN    Plan:   Admit   Repeat   Close monitoring  Seizure precautions  See orders. Krysta Barrett D.O.    [facility]

## 2022-11-16 NOTE — ANESTHESIA PROCEDURE NOTES
Spinal Block    Patient location during procedure: OB  End time: 11/16/2022 11:52 AM  Reason for block: primary anesthetic  Staffing  Performed: resident/CRNA   Anesthesiologist: Gema Bach MD  Resident/CRNA: JACI Pereyra CRNA  Spinal Block  Patient position: sitting  Prep: Betadine  Patient monitoring: cardiac monitor, frequent blood pressure checks and continuous pulse ox  Approach: midline  Location: L3/L4  Provider prep: mask and sterile gloves  Local infiltration: lidocaine  Needle  Needle type: Pencan   Needle gauge: 25 G  Needle length: 3.5 in  Assessment  Sensory level: T4  Swirl obtained: Yes  CSF: clear  Attempts: 1  Hemodynamics: stable  Additional Notes  Sitting position betadine x3 Sterile prep and drape. Skin wheal with 1% xylocaine. SAB with 20 ga. Introducer. Clear CSF aspirated. Medication injected. Pt assisted supine.  ROBERTO   Preanesthetic Checklist  Completed: patient identified, IV checked, risks and benefits discussed, equipment checked, pre-op evaluation, timeout performed, anesthesia consent given, oxygen available and monitors applied/VS acknowledged

## 2022-11-16 NOTE — L&D DELIVERY SUMMARY NOTE
Department of Obstetrics and Gynecology   Section Note    Indications: patient declines vag del attempt and severe preeclampsia    Pre-operative Diagnosis: 37 week 3 day pregnancy. Post-operative Diagnosis: Living  infant(s) and Female    Surgeon: Ehsan Suarez DO     Assistants: Marc Gaona    Anesthesia: Spinal anesthesia    ASA Class: see anesthesia notes    Procedure Details   The patient was seen in the Labor and Delivery Room. The risks, benefits, complications, treatment options, and expected outcomes were discussed with the patient. The patient concurred with the proposed plan, giving informed consent. The site of surgery properly noted. The patient was taken to Operating Room # 2, identified as Cynthia Moore and the procedure verified as  Delivery. A Time Out was held and the above information confirmed. After administration of spinal anesthesia, the patient was draped and prepped in the usual sterile manner. The previous Pfannenstiel scar was completely excised and the incision was carried down through the subcutaneous tissue to the fascia. Fascial incision was made and extended transversely. The fascia was  from the underlying rectus tissue superiorly and inferiorly. The peritoneum was identified and entered bluntly. Omental adhesions were noted to the anterior abdominal wall and rectus muscles. These were taken down initially to create exposure for delivery. Further lysis of omental adhesions was performed during closure of the peritoneum to completely free up the omentum. The peritoneal incision was extended longitudinally. The utero-vesical peritoneal reflection was incised transversely and the bladder flap was bluntly freed from the lower uterine segment. A low transverse uterine incision was made. Delivered from vertex presentation was a viable female  weighing 3.12 kg with Apgar scores of 8 at one minute and 9 at five minutes.  A nuchal cord x 3 was reduced upon delivery of the head. After the umbilical cord was clamped and cut, a segment of cord was collected and later discarded due to fetal wellbeing. Cord blood was obtained for evaluation. The placenta was removed intact and appeared normal. The uterine outline, tubes and ovaries appeared normal. The uterine incision was closed with a double layer of running locked sutures of 0 Vicryl. Hemostasis was observed. Further figure of eight 0 vicryl sutures were applied for further hemostasis. The utero-vesical peritoneal layer was re-approximated with 2-0 Vicryl. Hemostasis was noted. The fascia was then re-approximated with 0 Vicryl. Lavage was carried out until clear. The subcuticular layer was re-approximated with 2-0 Vicryl in a single interrupted fashion. The skin was re-approximated with 4-0 Vicryl in a running fashion. Steri strips, Mastisol and a sterile pressure bandage was applied. Patient tolerated the procedure well and was taken to L&D room in stable condition. Instrument, sponge, and needle counts were correct prior the abdominal closure and at the conclusion of the case. Findings:  Viable female     Intake/Output:     Date 11/15/22 07 - 22 0700(Not Admitted) 22 0701 - 22 0700   Shift 8560-1974 0586-4995 24 Hour Total 5648-0641 6421-9507 24 Hour Total   INTAKE   I.V.    2800  2800   IV Piggyback    300  300   Shift Total    3100  3100   OUTPUT   Blood    727  727     Quantitative Blood Loss (mL)    727  727   Shift Total    727  727   NET    2373  2373     IVF: 1800 cc crystalloid    UO: 1000 cc clear yellow urine    EBL: 727 cc          Drains: none                Specimens: placenta for hold specimen, incision scar           Implants: none           Complications:  none         Disposition: PACU - hemodynamically stable.            Condition: infant stable to special care nursery and mother stable    Attending Attestation: I performed the procedure.

## 2022-11-16 NOTE — LACTATION NOTE
This note was copied from a baby's chart. Lactation Progress Note      Data:    Follow up consult for multip on DOS with an infant born at 37.3 weeks gestation. MOB did not breast feed her first infant but did try pumping for a while. MOB states she had supply issues & switched to formula. That infant was born at 35 wks & is now 5years old. This infant transitioned in [facility] & was just brought back to mothers room. Infant is already very sleepy. Action:    Introduced self to patient as lactation, name and phone number written on white board in room. Educated MOB about cross cradle & football positions & the importance of good infant head support. Demonstrated for MOB how to hold infants head & how to position infant. Helped MOB get infant into football position at right breast. Infant very sleepy at the breast. Suggested MOB hand express to try to entice infant to feed. MOB able to express several large drops which were fed to sleepy infant. After about 10 minutes trying to get infant latched with no success, suggested MOB keep hand expressing into infants mouth or into medicine cup. MOB desires to try to express into medicine cup. Educated MOB how to do so & left room. Went back 10 minutes later & showed MOB how to feed expressed colostrum bk to infant. MOB only collected 3 large drops. Infant appeared to be awake in fathers arms, showing some small feeding cues. Suggested we try to get infant latched again. Infant did latch but was on & off the breast with suck bursts only. MOB has flat nipples but they do protrude a little with stimulation and very wide set breasts. Educated MOB about nipple shields & suggested if infant isn't latching we could try that at a later consult if she desired. Digital suck training revealed infant was uncoordinated. Suggested we try again in about 2 hours.      Reviewed with mother what to expect over the next  24-48 hours with infant feedings, infant output, how to know infant is getting enough, the importance of a deep latch and how to achieve it, how to break suction and try again if latch is shallow, normal  behavior, how to wake a sleepy infant to feed, how the breasts work to make milk, protecting milk supply, what to expect with cluster feeding, and breast care. Reviewed infant feeding cues and encouraged mother to allow infant to breast feed on demand anytime feeding cues are shown and if no feeding cues are shown to attempt to wake infant to feed every 2-3 hours. If infant is still too sleepy to latch to hand express colostrum into infants mouth for about ten minutes, then try again in 2-3 hours. After the first day of life to breast feed a minimum of 8-12 times a day per 24 hour period. Also encouraged mother to avoid giving infant a pacifier, bottle, or pump for at least the first two weeks of life or until breast feeding is well established. Encouraged good hydration, nutrition, and rest, and to keep taking prenatal vitamin while lactating. Encouraged much skin to skin between mother and infant and father and infant. Breast feeding log reviewed, all questions answered. Mother instructed to call lactation for F/U care as needed. Response:    MOB verbalized an understanding of education provided and will call for assistance as needed.

## 2022-11-16 NOTE — LACTATION NOTE
This note was copied from a baby's chart. Lactation Progress Note      Data:     RN requesting lactation consult for mother who is planning to breast feed but baby is transitioning in [facility]. Baby was born by c/sec. Mob states that she pumped for her first baby that was born at 35 weeks. Action: LC explained the importance of initiating breast stimulation within the first few hours if possible. Offered to assist with manual expression. Mob agreeable. She was able to express about 1 ml. LC explained to mob that if baby remains in the nursery we would set her up with a breast pump to assure good breast stimulation until baby is able to go to breast. Encouraged good hydration and nutrition when able after c/sec delivery. [facility] number on board for any f/u prn. Response: Verbalized and demonstrated understanding of initial education.

## 2022-11-17 LAB
A/G RATIO: 1 (ref 1.1–2.2)
ABO/RH: NORMAL
ALBUMIN SERPL-MCNC: 2.8 G/DL (ref 3.4–5)
ALP BLD-CCNC: 129 U/L (ref 40–129)
ALT SERPL-CCNC: 22 U/L (ref 10–40)
ANION GAP SERPL CALCULATED.3IONS-SCNC: 9 MMOL/L (ref 3–16)
AST SERPL-CCNC: 17 U/L (ref 15–37)
BASOPHILS ABSOLUTE: 0.1 K/UL (ref 0–0.2)
BASOPHILS RELATIVE PERCENT: 1 %
BILIRUB SERPL-MCNC: <0.2 MG/DL (ref 0–1)
BUN BLDV-MCNC: 7 MG/DL (ref 7–20)
CALCIUM SERPL-MCNC: 9 MG/DL (ref 8.3–10.6)
CHLORIDE BLD-SCNC: 104 MMOL/L (ref 99–110)
CO2: 24 MMOL/L (ref 21–32)
CREAT SERPL-MCNC: <0.5 MG/DL (ref 0.6–1.1)
EOSINOPHILS ABSOLUTE: 0.1 K/UL (ref 0–0.6)
EOSINOPHILS RELATIVE PERCENT: 1.1 %
FETAL SCREEN: NORMAL
GFR SERPL CREATININE-BSD FRML MDRD: >60 ML/MIN/{1.73_M2}
GLUCOSE BLD-MCNC: 86 MG/DL (ref 70–99)
HCT VFR BLD CALC: 31.8 % (ref 36–48)
HEMOGLOBIN: 10.9 G/DL (ref 12–16)
LYMPHOCYTES ABSOLUTE: 1.3 K/UL (ref 1–5.1)
LYMPHOCYTES RELATIVE PERCENT: 16.4 %
MCH RBC QN AUTO: 29 PG (ref 26–34)
MCHC RBC AUTO-ENTMCNC: 34.3 G/DL (ref 31–36)
MCV RBC AUTO: 84.4 FL (ref 80–100)
MONOCYTES ABSOLUTE: 0.7 K/UL (ref 0–1.3)
MONOCYTES RELATIVE PERCENT: 8.6 %
NEUTROPHILS ABSOLUTE: 5.9 K/UL (ref 1.7–7.7)
NEUTROPHILS RELATIVE PERCENT: 72.9 %
PDW BLD-RTO: 13 % (ref 12.4–15.4)
PLATELET # BLD: 184 K/UL (ref 135–450)
PMV BLD AUTO: 7.9 FL (ref 5–10.5)
POTASSIUM SERPL-SCNC: 4.4 MMOL/L (ref 3.5–5.1)
RBC # BLD: 3.77 M/UL (ref 4–5.2)
RHIG LOT NUMBER: NORMAL
SODIUM BLD-SCNC: 137 MMOL/L (ref 136–145)
TOTAL PROTEIN: 5.7 G/DL (ref 6.4–8.2)
TOTAL SYPHILLIS IGG/IGM: NORMAL
WBC # BLD: 8 K/UL (ref 4–11)

## 2022-11-17 PROCEDURE — 1220000000 HC SEMI PRIVATE OB R&B

## 2022-11-17 PROCEDURE — 99024 POSTOP FOLLOW-UP VISIT: CPT | Performed by: OBSTETRICS & GYNECOLOGY

## 2022-11-17 PROCEDURE — 85461 HEMOGLOBIN FETAL: CPT

## 2022-11-17 PROCEDURE — 86901 BLOOD TYPING SEROLOGIC RH(D): CPT

## 2022-11-17 PROCEDURE — 6370000000 HC RX 637 (ALT 250 FOR IP): Performed by: OBSTETRICS & GYNECOLOGY

## 2022-11-17 PROCEDURE — 6360000002 HC RX W HCPCS: Performed by: OBSTETRICS & GYNECOLOGY

## 2022-11-17 PROCEDURE — 80053 COMPREHEN METABOLIC PANEL: CPT

## 2022-11-17 PROCEDURE — 96372 THER/PROPH/DIAG INJ SC/IM: CPT

## 2022-11-17 PROCEDURE — 36415 COLL VENOUS BLD VENIPUNCTURE: CPT

## 2022-11-17 PROCEDURE — 85025 COMPLETE CBC W/AUTO DIFF WBC: CPT

## 2022-11-17 PROCEDURE — 86900 BLOOD TYPING SEROLOGIC ABO: CPT

## 2022-11-17 RX ADMIN — IBUPROFEN 800 MG: 800 TABLET, FILM COATED ORAL at 01:09

## 2022-11-17 RX ADMIN — ACETAMINOPHEN 1000 MG: 500 TABLET ORAL at 23:27

## 2022-11-17 RX ADMIN — METFORMIN HYDROCHLORIDE 1 TABLET: 500 TABLET, EXTENDED RELEASE ORAL at 09:26

## 2022-11-17 RX ADMIN — DOCUSATE SODIUM 100 MG: 100 CAPSULE, LIQUID FILLED ORAL at 09:26

## 2022-11-17 RX ADMIN — IBUPROFEN 800 MG: 800 TABLET, FILM COATED ORAL at 17:44

## 2022-11-17 RX ADMIN — DOCUSATE SODIUM 100 MG: 100 CAPSULE, LIQUID FILLED ORAL at 23:27

## 2022-11-17 RX ADMIN — ACETAMINOPHEN 1000 MG: 500 TABLET ORAL at 15:32

## 2022-11-17 RX ADMIN — LISINOPRIL 10 MG: 10 TABLET ORAL at 15:33

## 2022-11-17 RX ADMIN — HUMAN RHO(D) IMMUNE GLOBULIN 300 MCG: 300 INJECTION, SOLUTION INTRAMUSCULAR at 21:14

## 2022-11-17 RX ADMIN — IBUPROFEN 800 MG: 800 TABLET, FILM COATED ORAL at 09:26

## 2022-11-17 RX ADMIN — ACETAMINOPHEN 1000 MG: 500 TABLET ORAL at 06:55

## 2022-11-17 ASSESSMENT — PAIN DESCRIPTION - LOCATION
LOCATION: ABDOMEN

## 2022-11-17 ASSESSMENT — PAIN SCALES - GENERAL
PAINLEVEL_OUTOF10: 6
PAINLEVEL_OUTOF10: 4
PAINLEVEL_OUTOF10: 7
PAINLEVEL_OUTOF10: 6
PAINLEVEL_OUTOF10: 7
PAINLEVEL_OUTOF10: 6

## 2022-11-17 ASSESSMENT — PAIN - FUNCTIONAL ASSESSMENT
PAIN_FUNCTIONAL_ASSESSMENT: ACTIVITIES ARE NOT PREVENTED
PAIN_FUNCTIONAL_ASSESSMENT: ACTIVITIES ARE NOT PREVENTED

## 2022-11-17 ASSESSMENT — PAIN DESCRIPTION - DESCRIPTORS
DESCRIPTORS: DISCOMFORT;SORE
DESCRIPTORS: DISCOMFORT
DESCRIPTORS: CRAMPING;DISCOMFORT

## 2022-11-17 ASSESSMENT — PAIN DESCRIPTION - ORIENTATION
ORIENTATION: LOWER

## 2022-11-17 NOTE — PROGRESS NOTES
Offered to get pt up OOB. Pt states that she is not ready to get up. Informed pt of benefits of getting up OOB. Pt listening, and states that maybe she will get up early in the morning.

## 2022-11-17 NOTE — LACTATION NOTE
This note was copied from a baby's chart. Lactation Progress Note      Data:     F/u during lactation rounds with multip breast feeder, 1 pp. Mother states needing to use a nipple shield to help baby latch onto flat nipples, but \"is not a big fan of the shield. \" Mother states that her baby is sleepy with attempts to offer the breast today. Mother has been pumping with hospital grade breast pump per her request due to history of low milk supply with her first baby. Mother states she delivered at 35 weeks gestation. When [employer] asked how long she pumped for mother states \"not long\". Denies any known risk factors for low milk supply. Action: Introduced self as [employer] on for the day and offered support. Reassured sleepy behavior is common on the first DOL as baby recovers from birth. Education provided on how milk production works, possible causes of low supply with first baby, risk factors for low supply, and tips to encourage good milk supply with this baby. Warned of risk for overstimulation of the breast and oversupply if infant begins cluster feeding and is transferring milk well at the breast. Encouraged to pump as needed for poor feedings, if desires to pump, being careful not to overstimulate the breast if infant is feeding well. Reviewed tips for obtaining JAY to the breast, and importance of sustained deep latch. Reassured especially given history LC will support mother if desires to pump. Discussed tips for weaning from the nipple shield as baby is able, but discussed may be needed for sustained latching initially. Educated on risks vs benefits with use of nipple shield. Discussed pumping while using shield if desires due to risk of decreased milk transfer. Reviewed importance of JAY with or without the shield, educating on how a good latch should look and feel and encouraged to call for LC to assess the latch with the next feeding and as needed during hospital stay. Offered support with latching now.  Mother declines. Educated that the latch should feel comfortable without pinching or pain, and instructed on how to break the suction of the latch as needed if ever experiencing discomfort. Reviewed what to expect with breast feeding over the next 24-48 hours including breast care, how milk production works and types of milk mother will produce, educated on signs of hunger/satiety and expected  feeding behaviors, as well as reassuring signs that baby is getting enough at the breast including daily goals for infant feedings, output, and weight trends. Encouraged to offer the breast when infant first begins to wake and show early hunger cues, and every 2-3 hours if baby is sleepy and without feeding cues. Gave tips to wake sleepy baby as needed, and encouraged much hand expression and STS contact with attempts to offer the breast. Instructed that baby should have a minimum of 8-12 good feedings after the first DOL. Encouraged exclusive breast feeding, educating on benefits, and how milk production works. Reviewed pumping education as well, how to use pump, collection/storage/feeding EBM. Name and number provided on whiteboard. Encouraged to call for [facility] to assess latch and for f/u support prn. Response: Verbalized understanding of teaching provided. Will call for f/u support prn.

## 2022-11-17 NOTE — PLAN OF CARE
Problem: Vaginal Birth or  Section  Goal: Fetal and maternal status remain reassuring during the birth process  Description:  Birth OB-Pregnancy care plan goal which identifies if the fetal and maternal status remain reassuring during the birth process  Outcome: Progressing     Problem: Postpartum  Goal: Experiences normal postpartum course  Description:  Postpartum OB-Pregnancy care plan goal which identifies if the mother is experiencing a normal postpartum course  Outcome: Progressing  Goal: Appropriate maternal -  bonding  Description:  Postpartum OB-Pregnancy care plan goal which identifies if the mother and  are bonding appropriately  Outcome: Progressing  Goal: Establishment of infant feeding pattern  Description:  Postpartum OB-Pregnancy care plan goal which identifies if the mother is establishing a feeding pattern with their   Outcome: Progressing  Goal: Incisions, wounds, or drain sites healing without S/S of infection  Outcome: Progressing     Problem: Pain  Goal: Verbalizes/displays adequate comfort level or baseline comfort level  Outcome: Progressing     Problem: Infection - Adult  Goal: Absence of infection at discharge  Outcome: Progressing  Goal: Absence of infection during hospitalization  Outcome: Progressing     Problem: Safety - Adult  Goal: Free from fall injury  Outcome: Progressing     Problem: Discharge Planning  Goal: Discharge to home or other facility with appropriate resources  Outcome: Progressing     Problem: Chronic Conditions and Co-morbidities  Goal: Patient's chronic conditions and co-morbidity symptoms are monitored and maintained or improved  Outcome: Progressing     Problem: Skin/Tissue Integrity - Adult  Goal: Incisions, wounds, or drain sites healing without S/S of infection  Outcome: Progressing     Problem: Genitourinary - Adult  Goal: Absence of urinary retention  Outcome: Progressing  Goal: Urinary catheter remains patent  Outcome: Progressing

## 2022-11-17 NOTE — LACTATION NOTE
This note was copied from a baby's chart. Lactation Progress Note      Data:   Mother requests f/u support. Baby is latched on consult with nipple shield, but shallow, and mother c/o sore nipples. States baby just finished breast feeding for 15 minutes or longer on the right breast.       Action: Shown signs of shallow latch and gave tips for deeper latch with nipple shield, educating on importance. Assisted with deepening the latch. Mother states more comfortable now. Shown reassuring signs of JAY and gave praise. Lanolin provided for sore nipples. Reviewed tips for deep latch, weaning from shield as able, and encouraged to call for f/u support prn. Response: Verbalized understanding of teaching provided. Pleased with good feeding. Will call for f/u support prn.

## 2022-11-17 NOTE — ANESTHESIA POSTPROCEDURE EVALUATION
Department of Anesthesiology  Postprocedure Note    Patient: Reji Tsang  MRN: [id number]  [place]: 1995  Date of evaluation: 2022      Procedure Summary     Date: 22 Room / Location: [facility] L&D OR [address] [place] [address]    Anesthesia Start: 36 Anesthesia Stop: 6058    Procedure:  SECTION (Abdomen) Diagnosis:       S/P repeat low transverse       (history of chtn, and hellp syndrome)    Surgeons: Ezequiel Grimes DO Responsible Provider: Matty Hoover MD    Anesthesia Type: Spinal ASA Status: 3 - Emergent          Anesthesia Type: Spinal    Rishi Phase I: Rishi Score: 9    Rishi Phase II: Rishi Score: 10      Anesthesia Post Evaluation    Patient location during evaluation: bedside  Patient participation: complete - patient participated  Level of consciousness: awake and alert  Airway patency: patent  Nausea & Vomiting: no nausea and no vomiting  Complications: no  Cardiovascular status: hemodynamically stable  Respiratory status: nonlabored ventilation, spontaneous ventilation and room air  Hydration status: stable  Comments: Mild itching  Multimodal analgesia pain management approach

## 2022-11-17 NOTE — PROGRESS NOTES
Post Partum Progress Note    Subjective:  Kathy Briones is a 28 y.o. [id number] status-post  uncomplicated . The pregnancy was complicated by  low transverse  on 13 secondary to HELLP, severe pre-eclampsia, Rh negative, COVID-19 in second trimester, and recent UTI---treated with macrobid and cHTN on meds . Patient is doing well with no concerns. Pain is well controlled with current regimen. Lochia moderate. Tolerating regular diet without difficulty. Ambulating without difficulty, denies dizziness on standing. Avila catheter removed this AM, has not yet spontaneously voided. No BM. She is breast feeding. Denies chest pain, SOB, or leg pain. Objective:  /63   Pulse 62   Temp 98 °F (36.7 °C) (Oral)   Resp 18   Ht 5' 2\" (1.575 m)   Wt 226 lb (102.5 kg)   LMP 2022   SpO2 99%   Breastfeeding Unknown   BMI 41.34 kg/m²     GENERAL APPEARANCE: alert, well appearing  LUNGS: nonlabored respirations  HEART: regular rate and rhythm  ABDOMEN POSTPARTUM: soft, appropriately tender, fundus firm at U, incision with dressing in place that is c/d/i  EXTREMITIES: no redness or tenderness in the calves or thighs, mild edema    I/O last 3 completed shifts: In: 4767.2 [I.V.:4467.2; IV Piggyback:300]  Out: 9074 [Urine:2575; Blood:727]    Pertinent Labs:   CBC:   Recent Labs     11/15/22  1737 11/16/22  0930 22  0740   WBC 9.3 10.9 8.0   HGB 12.7 12.6 10.9*   HCT 37.6 37.3 31.8*    227 184     BMP:    Recent Labs     11/15/22  1737 11/16/22  0930 22  0740    138 137   K 4.3 3.9 4.4    105 104   CO2 17* 20* 24   BUN 7 9 7   CREATININE <0.5* <0.5* <0.5*   GLUCOSE 68* 89 86     Hepatic:   Recent Labs     11/15/22  1737 11/16/22  0930 22  0740   AST 17 15 17   ALT 28 26 22   BILITOT <0.2 <0.2 <0.2   ALKPHOS 180* 165* 129     Mag:    No results for input(s): MG in the last 72 hours. Phos:   No results for input(s): PHOS in the last 72 hours.    INR: Recent Labs     22  0930   INR 0.98       Assessment/Plan:  Jessie Vaz is a 28 y.o. S3O2251 status-post  uncomplicated . 1. POD #1: doing well, routine care    2. Infant: F, , 3120g    3. Labs: A-/ab-, RI, GBS neg  - baby O+, will need PP rhogam (ordered)    4.  cHTN on meds   - delivered at 37+3 due to worsening blood pressures at term  - HELLP labs as above sent and stable  - normotensive overnight, will monitor closely and resend labs PRN  - lisinopril ordered (pt was on this prior to pregnancy)    Dispo: d/c home POD2-3 pending clinical course    Jr Andrews MD  [facility]

## 2022-11-18 VITALS
TEMPERATURE: 98 F | RESPIRATION RATE: 16 BRPM | SYSTOLIC BLOOD PRESSURE: 127 MMHG | OXYGEN SATURATION: 98 % | WEIGHT: 226 LBS | BODY MASS INDEX: 41.59 KG/M2 | HEART RATE: 95 BPM | DIASTOLIC BLOOD PRESSURE: 74 MMHG | HEIGHT: 62 IN

## 2022-11-18 PROCEDURE — 6360000002 HC RX W HCPCS: Performed by: OBSTETRICS & GYNECOLOGY

## 2022-11-18 PROCEDURE — G0008 ADMIN INFLUENZA VIRUS VAC: HCPCS | Performed by: OBSTETRICS & GYNECOLOGY

## 2022-11-18 PROCEDURE — 99024 POSTOP FOLLOW-UP VISIT: CPT | Performed by: OBSTETRICS & GYNECOLOGY

## 2022-11-18 PROCEDURE — 6370000000 HC RX 637 (ALT 250 FOR IP): Performed by: OBSTETRICS & GYNECOLOGY

## 2022-11-18 PROCEDURE — 90686 IIV4 VACC NO PRSV 0.5 ML IM: CPT | Performed by: OBSTETRICS & GYNECOLOGY

## 2022-11-18 RX ORDER — IBUPROFEN 800 MG/1
800 TABLET ORAL EVERY 8 HOURS PRN
Qty: 40 TABLET | Refills: 0 | Status: SHIPPED | OUTPATIENT
Start: 2022-11-18

## 2022-11-18 RX ORDER — LISINOPRIL 10 MG/1
10 TABLET ORAL DAILY
Qty: 90 TABLET | Refills: 0 | Status: SHIPPED | OUTPATIENT
Start: 2022-11-18

## 2022-11-18 RX ORDER — OXYCODONE HYDROCHLORIDE 5 MG/1
5 TABLET ORAL EVERY 6 HOURS PRN
Qty: 20 TABLET | Refills: 0 | Status: ON HOLD | OUTPATIENT
Start: 2022-11-18 | End: 2022-11-25 | Stop reason: HOSPADM

## 2022-11-18 RX ORDER — PSEUDOEPHEDRINE HCL 30 MG
100 TABLET ORAL 2 TIMES DAILY PRN
Qty: 30 CAPSULE | Refills: 0 | Status: SHIPPED | OUTPATIENT
Start: 2022-11-18

## 2022-11-18 RX ADMIN — ACETAMINOPHEN 1000 MG: 500 TABLET ORAL at 07:45

## 2022-11-18 RX ADMIN — INFLUENZA A VIRUS A/VICTORIA/2570/2019 IVR-215 (H1N1) ANTIGEN (PROPIOLACTONE INACTIVATED), INFLUENZA A VIRUS A/DARWIN/6/2021 IVR-227 (H3N2) ANTIGEN (PROPIOLACTONE INACTIVATED), INFLUENZA B VIRUS B/AUSTRIA/1359417/2021 BVR-26 ANTIGEN (PROPIOLACTONE INACTIVATED), INFLUENZA B VIRUS B/PHUKET/3073/2013 BVR-1B ANTIGEN (PROPIOLACTONE INACTIVATED) 0.5 ML: 15; 15; 15; 15 INJECTION, SOLUTION INTRAMUSCULAR at 12:21

## 2022-11-18 RX ADMIN — DOCUSATE SODIUM 100 MG: 100 CAPSULE, LIQUID FILLED ORAL at 07:51

## 2022-11-18 RX ADMIN — IBUPROFEN 800 MG: 800 TABLET, FILM COATED ORAL at 03:25

## 2022-11-18 RX ADMIN — IBUPROFEN 800 MG: 800 TABLET, FILM COATED ORAL at 13:14

## 2022-11-18 RX ADMIN — METFORMIN HYDROCHLORIDE 1 TABLET: 500 TABLET, EXTENDED RELEASE ORAL at 07:51

## 2022-11-18 RX ADMIN — LISINOPRIL 10 MG: 10 TABLET ORAL at 07:51

## 2022-11-18 ASSESSMENT — PAIN DESCRIPTION - LOCATION
LOCATION: ABDOMEN;INCISION
LOCATION: ABDOMEN
LOCATION: ABDOMEN

## 2022-11-18 ASSESSMENT — PAIN DESCRIPTION - DESCRIPTORS
DESCRIPTORS: ACHING
DESCRIPTORS: DISCOMFORT;SORE
DESCRIPTORS: SORE;TENDER

## 2022-11-18 ASSESSMENT — PAIN SCALES - GENERAL
PAINLEVEL_OUTOF10: 5
PAINLEVEL_OUTOF10: 6
PAINLEVEL_OUTOF10: 8

## 2022-11-18 ASSESSMENT — PAIN DESCRIPTION - ORIENTATION: ORIENTATION: LOWER

## 2022-11-18 ASSESSMENT — PAIN - FUNCTIONAL ASSESSMENT
PAIN_FUNCTIONAL_ASSESSMENT: ACTIVITIES ARE NOT PREVENTED

## 2022-11-18 NOTE — DISCHARGE INSTRUCTIONS
Call for increased pain, significant vaginal bleeding or temperature greater than 101 degrees F. Follow up 2 weeks. Thank you for the opportunity to care for you and your family. We hope that you are happy with the care we provided during your stay in the [facility]. We want to ensure that you have the help that you need when you leave the hospital. If there is anything that we can assist you with please let us know. Breastfeeding mothers may contact our lactation specialists with any problems or questions. [facility] lactation services phone number is [phone]. Leave a message and your call will be returned. Please refer to the information provided in the postpartum care booklet. The following are warning signs to remember. Call 911 if you have:    Chest pain or pressure  Shortness of breath, even at rest  Thoughts of hurting yourself or others  Seizures    Call your healthcare provider if you have:    Temperature of 100.4 degrees or higher  Stitches that are not healing             --Swelling, bleeding, drainage, foul odor, redness or warmth in/around your                 stitches, staples, or incision (scar)               -- Bad smelling blood or discharge from the vagina  Vaginal bleeding that has increased             --Soaking through one pad in an hour             --You are passing clots larger than the size of a lemon. Red, warm tender area(s) in your breast or calf. Headache that does not get better, even after taking medicine; or headache with vision changes    Remember to notify all healthcare providers from your date of delivery up to one year after birth! CARING FOR YOURSELF      DIET/ACTIVITY    Eat a well balanced diet focusing on food high in fiber and protein. Drink plenty of fluids, especially water. To avoid constipation you may take a mild stool softener as recommended by your doctor or midwife. Gradually increase your activity.  Resume an exercise regime only after being advised by your doctor or midwife. When sitting or lying down, keep your legs elevated to reduce swelling. Avoid lifting anything heavier than your baby or a gallon of milk. Avoid driving for two weeks or while taking narcotics. No sexual intercourse for 6 weeks, or until advised by your OB provider. Nothing in the vagina: intercourse, tampons or douching. Be prepared to discuss family planning at your follow up OB visit. If you feel tired and have a lack of energy, you may continue to take your prenatal vitamins. Nap when your baby naps to catch up on sleep. EMOTIONS    You may feel feliz, sad, teary and overwhelmed. Contact your OB provider if you think you may be showing signs of postpartum depression. If your baby will not stop crying, contact another adult to help or place the baby in its crib on its back and take a break. Never shake your baby! INCISIONAL/ANGELICA CARE    Clean your incision in the shower with mild soap. After shower pat the incision area dry and allow it to be open to the air. If used, steri strips should be removed by 2 weeks. If you are discharged with staples in place, call your OB provider's office to schedule removal.  Vaginal bleeding will decrease in amount over the next few weeks. Cleanse your perineum from front to back using the angelica bottle, after toileting, until bleeding stops. You will notice that as your activity increases, your flow may also increase. This is a sign that you need to rest more often. Call your OB provider if you are saturating more than one maxi pad in an hour and rest does not help. BREAST CARE    FOR BREASTFEEDING MOMS:    If you become engorged, feeding may be more difficult or painful for 1 to 2 days. You may find it helpful to hand express some milk so that the infant can latch more easily. While breastfeeding continue to take your prenatal vitamins as directed.   Refer to the breastfeeding information in the discharge binder. FOR NON-BREASTFEEDING MOMS:    You may apply ice packs to your breasts, over your bra, for 20 minutes at a time for comfort. Do not express breast milk. Avoid stimulation to your breasts. When showering, allow the water to strike only your back. Wear a good fitting bra, such as a sports bra, until your milk dries up. OTHER REASONS TO CALL YOUR DOCTOR    Your abdomen is tender to touch or you have pain that cannot be relieved. Flu-like symptoms such as achy muscles and joints or you are experiencing extreme weakness or dizziness. Persistent burning or increased urgency in urination. LITERATURE PROVIDED    For Moms and Those who Care about Them. Your [place] Healthy Start, [address]. Breastfeeding Best for [employer], Best for Mom. [address] Information about Universal Hearing Screening. Controlling Pain. I have received the educational material listed above,  The  Channel has provided me with the opportunity to view instructional videos pertaining to infant care and the care of myself. I verify that I have received the above information and have no further questions and feel confident to care for myself and my baby. For more information about postpartum care or baby care and feeding, create a [facility] My Chart account, sign in and search using the magnifying glass and type in postpartum, breastfeeding or formula feeding. [email or web address]

## 2022-11-18 NOTE — LACTATION NOTE
This note was copied from a baby's chart. Lactation Progress Note      Data:     F/u on during lactation rounds with multip breast feeder, 2 pp. Infant at 6% weight loss, parents report voiding and stooling, and baby cluster fed overnight. Mother continues needing to use a nipple shield with feedings, but trying to wean from shield and latch directly to the breast. Mother reports struggles to latch deeply with the shield and c/o sore nipples. States will try to continue with exclusive breast feeding until her mature milk volumes come in, but would like to change to exclusively pumping at that time, due to sore nipples, and pain with latching. Action:  Introduced self as [facility] on for the day and offered support. Encouraged to call for [facility] to assess the latch with the next feeding. Reviewed importance of deep latching with and without the nipple shield. Oral anatomy assessment done with gloved finger. Infant able to extend tongue well beyond lower gumline, and good tongue mobility noted. Good rhythmic sucking noted with digital suck evaluation done. Reassured mother of good mobility of tongue, good suck noted, and does not appear to have tongue tie. Reviewed how to apply shield properly, appropriate fit of nipple shield, how to achieve deep latch, and tips for weaning from the shield. Discussed that it may take time to wean from nipple shield. Reviewed monitoring for deep latch and effective feeding and milk transfer at the breast with nipple shield. Educated on signs of good hydration with infant. Encouraged pumping if concern for poor milk transfer were to arise including decreased UOP, and/or poor weight gain. Provided with nipple shield for D/C home. Gave medium shield to try for more comfort and reviewed tips for deep latch. Reviewed care of sore nipples, and gave lanolin and hydrogel pads for comfort care and instruction provided on how to use. Stressed importance of deep latching for healing.  Instructed on available outpatient lactation support services including [employer] warm line, breast feeding support group, and outpatient lactation clinic. Encouraged to utilize as needed. Discharge breastfeeding education reviewed including breast care, process of lactogenesis, prevention and treatment of engorgement, monitoring for signs of milk stasis/infection and f/u as needed, reviewed signs of hunger and satiety, expected  feeding behaviors during the first few days and weeks of life, and how to know baby is getting enough at the breast including daily minimum goals for infant feedings, output and expected weight trends. Encouraged much STS, offering the breast when baby first begins to wake and show hunger cues, and every 2-3 hours if baby is sleepy and without feeding cues. Instructed that baby should have a minimum of 8-12 feedings in a 24 hour period after the first DOL. Reviewed tips for preparing for return to work including pumping/expressing breast milk, collection, storage, preparation of expressed breast milk, and introduction of a bottle using a slow flow nipple and paced feedings. Educated on risks related to use of pacifiers, artificial nipples, and supplements to the breastfeeding relationship and risks to milk supply. Encouraged exclusive breast feeding, waiting 4 weeks to pump, offer bottles of EBM, or pacifiers unless medical indication were to arise sooner, educating on benefits to establish a good milk supply. Reassured mother if desires to transition to exclusive pumping sooner we will fully support her decision. Discussed plan for exclusive pumping. Educated on the importance to pump q3h x 15 minutes and a minimum of 8x per day. Educated on importance not to skip overnight pumping sessions, educating on when prolactin levels are highest. Name and number provided on whiteboard. Encouraged to call for f/u support as needed. Response: Verbalized understanding of teaching provided.  Confident with breastfeeding and discharge home. Will call for f/u support prn.

## 2022-11-18 NOTE — DISCHARGE SUMMARY
DISCHARGE SUMMARY      Primary Diagnosis: intrauterine pregnancy at 40 weeks 3 days gestation  Secondary Diagnosis: chronic HTN, history of HELLP/severe pre-eclampsia, Rha negative, COVID-19 in second trimester, recent UTI, mild anemia secondary to acute blood loss, history of   Procedures: repeat low transverse   Referrals: lactation  Significant Findings: viable female   Reason for Hospitalization: repeat   Complications: none        Discharge Instructions:    Diet:common adult  Activity: activity as tolerated, no heavy lifting or driving for 2 weeks, pelvic rest x 6 weeks   Medications: ibuprofen, colace, Tylenol, lisinopril, roxicodone   Disposition: Home  Condition on discharge: Stable  Follow up: in 2 week(s)

## 2022-11-18 NOTE — LACTATION NOTE
This note was copied from a baby's chart. Lactation Progress Note      Data:   F/U on multip breast feeder during lactation rounds. MOB states that infant has been cluster feeding this afternoon/evening. She states that she is having to use the nipple shield with each feed due to flat nipples and infant having a hard time latching on. She feels that infant is not getting a deep latch but more just latching onto the nipple. MOB does have a pump in room that she requested due to a history of low milk supply with first baby. She states that she pumped this morning but has not since because infant has been cluster feeding. Visitors in room at this time. Action: Encouraged MOB to call with next feed for LC to assess latch and give tips for a deeper latch. Encouraged her that with cluster feeding that she is doing great with not pumping as it could lead to engorgement and over production. Response: MOB plans to call with next feed around 1945 and prn for f/u.

## 2022-11-18 NOTE — LACTATION NOTE
This note was copied from a baby's chart. Lactation Progress Note      Data:   MOB called requesting assistance with latching. Action: Upon consultation infant already at the breast in football latched using a shield. Per MOB infant had been breast feeding for about 10 minutes. Latch appeared shallow with upper lip turned in. MOB shown how to bring upper lip out and how to gently pull chin down as infant is nursing to try to get infant to open mouth wider. Instructed her that if latch still appears shallow or she is feeling pinching, to unlatch infant and relatch on. After a few minutes however infant came off of breast, rooted and brought back to breast. Encouraged MOB to wait until infant's mouth is opened wide before bringing back to breast which MOB demonstrated. JAY observed with SRS and swallows noted. Also encouraged her to keep infant close to breast during nursing session so that shield does not slip in and out of infant's mouth as she nursed, it is ok if the tip of infant's nose is touching her skin as long as the sides of her nose are cleared. After a few more minutes infant came off of breast asleep. Questions answered regarding the nipple shield. Discussed risks and benefits with the shield long term. Also went over tips for weaning from the shield. Encouraged MOB to call outpatient with any questions or help once discharged home. All questions answered. Encouraged to call prn for f/u. Response: MOB verbalized an understanding of teaching. Will call prn for f/u.

## 2022-11-18 NOTE — PROGRESS NOTES
Postpartum and infant care teaching completed and forms signed by patient. Copy witnessed by RN and given to patient. Patient verbalized understanding of all teaching points. Prescriptions were given to patient and . Patient plans to follow-up with [facility] Provider as instructed. Patient verbalizes understanding of discharge instructions and denies further questions. ID bands checked. Infant's ID band and Mother's matching ID bands removed and taped to discharge instruction sheet, the mother verified as correct and witnessed by RN. Umbilical clamp and HUGS tag removed. Mom and  Infant discharged via wheelchair to private car. Infant placed in car seat per parents. Mom and baby accompanied by family and in stable condition. ID bands checked. Infant's ID band and Mother's matching ID bands removed and taped to discharge instruction sheet, the mother verified as correct and witnessed by RN. Umbilical clamp and HUGS tag removed. Mom and  Infant discharged via wheelchair to private car. Infant placed in car seat per parents. Mom and baby accompanied by family and in stable condition.

## 2022-11-18 NOTE — PROGRESS NOTES
Post Partum Progress Note     Subjective:  Sierra Franklin is a 28 y.o. [id number] status-post  uncomplicated  POD #2. The pregnancy was complicated by  low transverse  on 13 secondary to HELLP, severe pre-eclampsia, Rh negative, COVID-19 in second trimester, and recent UTI---treated with macrobid and cHTN on meds . Patient is doing well with no concerns. Pain is well controlled with current regimen. Lochia moderate. Tolerating regular diet without difficulty. Ambulating without difficulty, denies dizziness on standing. Voiding. Passing flatus. She is breast feeding. Denies chest pain, SOB, or leg pain. Objective:  Vitals:    22 1530 22 0328 22 0751   BP: (!) 113/59 137/68 127/77 133/78   Pulse: 80 96 75 87   Resp:  18    Temp:  98.4 °F (36.9 °C) 97.9 °F (36.6 °C)    TempSrc:  Oral Oral    SpO2:       Weight:       Height:            GENERAL APPEARANCE: alert, well appearing  LUNGS: nonlabored respirations  HEART: regular rate and rhythm  ABDOMEN POSTPARTUM: soft, appropriately tender, fundus firm at U, incision is c/d/i  EXTREMITIES: no redness or tenderness in the calves or thighs, mild edema  Pertinent Labs:   CBC:         Recent Labs     11/15/22  1737 11/16/22  0930 22  0740   WBC 9.3 10.9 8.0   HGB 12.7 12.6 10.9*   HCT 37.6 37.3 31.8*    227 184      BMP:          Recent Labs     11/15/22  1737 11/16/22  0930 22  0740    138 137   K 4.3 3.9 4.4    105 104   CO2 17* 20* 24   BUN 7 9 7   CREATININE <0.5* <0.5* <0.5*   GLUCOSE 68* 89 86      Hepatic:         Recent Labs     11/15/22  1737 11/16/22  0930 22  0740   AST 17 15 17   ALT 28 26 22   BILITOT <0.2 <0.2 <0.2   ALKPHOS 180* 165* 129          Recent Labs     22  0930   INR 0.98         Assessment/Plan:  Sierra Franklin is a 28 y.o. [id number] status-post  uncomplicated . 1. POD #2: doing well, routine care     2.  Infant: F, , 3120g     3. Labs: A-/ab-, RI, GBS neg  - baby O+, will need PP rhogam (ordered)     4. cHTN on meds   - delivered at 37+3 due to worsening blood pressures at term  - HELLP labs as above sent and stable  - normotensive overnight, will monitor closely and resend labs PRN  - lisinopril ordered (pt was on this prior to pregnancy)  5. Mild anemia--secondary to acute blood loss     Dispo:   Home today. Follow up prn and 2 weeks for postpartum visit.      [facility]

## 2022-11-18 NOTE — PROGRESS NOTES
Discharge Phone Call    Patient Name: Anny Cardoza     [facility] Care Provider: Gloria Arenas DO Discharge Date: 2022    Disposition of baby:    Phone Number: [phone] (home)     Attempts to Contact:  Date:    Caller  Date:    Caller  Date:    Caller    Information for the patient's :  Carlee Cavanaugh [[id number]]   Delivery Method: , Low Transverse     1. Now that you are at home is your pain being well controlled? Y/N   If no, instruct to call       provider. 2. Are you breastfeeding? Y/N    Do you need any extra support from our lactation staff? Y/N    If yes, provide number for lactation. 3. Have you made or already had your first appointment with the baby's doctor? Y/N   If no, do      you know when to schedule it? Y/N    4. Have you scheduled your follow-up appointment? Y/N  If no, do you know when to schedule       it? Y/N   If no, they can find it on printed discharge instructions. 5. Did staff discuss safe sleep during your stay? Y/N   6. Did we explain things in a way you could understand? Y/N  7. Were we respectful of your preferences for labor and birth and include you in the plan of       care? Y/N  If no, please explain _______________________________________________  8. Is there anyone in particular you would like to mention who provided care for you? _______      _________________________________________________________________________     9. Were you given a Post-Birth Warning Signs handout? Y/N  Do you have it somewhere      easily accessible? Y/N  If no, please send them a copy and ask them to put it somewhere      easily found. 10. Have you been crying excessively, having anger or mood swings that feel out of control, or       feel like you can't cope with caring for yourself or baby? Y/N   If yes, they may be showing       signs of postpartum depression and should call provider.  There is also a        depression

## 2022-11-18 NOTE — DISCHARGE INSTR - DIET

## 2022-11-23 ENCOUNTER — TELEPHONE (OUTPATIENT)
Dept: OBGYN CLINIC | Age: 27
End: 2022-11-23

## 2022-11-23 ENCOUNTER — HOSPITAL ENCOUNTER (OUTPATIENT)
Age: 27
Setting detail: OBSERVATION
Discharge: HOME | End: 2022-11-25
Attending: OBSTETRICS & GYNECOLOGY | Admitting: OBSTETRICS & GYNECOLOGY
Payer: COMMERCIAL

## 2022-11-23 LAB
A/G RATIO: 1.2 (ref 1.1–2.2)
A/G RATIO: 1.3 (ref 1.1–2.2)
ALBUMIN SERPL-MCNC: 3.4 G/DL (ref 3.4–5)
ALBUMIN SERPL-MCNC: 3.5 G/DL (ref 3.4–5)
ALP BLD-CCNC: 87 U/L (ref 40–129)
ALP BLD-CCNC: 92 U/L (ref 40–129)
ALT SERPL-CCNC: 19 U/L (ref 10–40)
ALT SERPL-CCNC: 20 U/L (ref 10–40)
ANION GAP SERPL CALCULATED.3IONS-SCNC: 10 MMOL/L (ref 3–16)
ANION GAP SERPL CALCULATED.3IONS-SCNC: 9 MMOL/L (ref 3–16)
APTT: 23.8 SEC (ref 23–34.3)
AST SERPL-CCNC: 14 U/L (ref 15–37)
AST SERPL-CCNC: 17 U/L (ref 15–37)
BASOPHILS ABSOLUTE: 0 K/UL (ref 0–0.2)
BASOPHILS ABSOLUTE: 0.1 K/UL (ref 0–0.2)
BASOPHILS RELATIVE PERCENT: 0.1 %
BASOPHILS RELATIVE PERCENT: 1.1 %
BILIRUB SERPL-MCNC: 0.3 MG/DL (ref 0–1)
BILIRUB SERPL-MCNC: <0.2 MG/DL (ref 0–1)
BILIRUBIN URINE: NEGATIVE
BLOOD, URINE: NEGATIVE
BUN BLDV-MCNC: 11 MG/DL (ref 7–20)
BUN BLDV-MCNC: 9 MG/DL (ref 7–20)
CALCIUM SERPL-MCNC: 8.8 MG/DL (ref 8.3–10.6)
CALCIUM SERPL-MCNC: 8.9 MG/DL (ref 8.3–10.6)
CHLORIDE BLD-SCNC: 105 MMOL/L (ref 99–110)
CHLORIDE BLD-SCNC: 108 MMOL/L (ref 99–110)
CLARITY: CLEAR
CO2: 25 MMOL/L (ref 21–32)
CO2: 28 MMOL/L (ref 21–32)
COLOR: YELLOW
CREAT SERPL-MCNC: 0.6 MG/DL (ref 0.6–1.1)
CREAT SERPL-MCNC: 0.7 MG/DL (ref 0.6–1.1)
CREATININE URINE: 22 MG/DL (ref 28–259)
EOSINOPHILS ABSOLUTE: 0.1 K/UL (ref 0–0.6)
EOSINOPHILS ABSOLUTE: 0.2 K/UL (ref 0–0.6)
EOSINOPHILS RELATIVE PERCENT: 2.3 %
EOSINOPHILS RELATIVE PERCENT: 2.6 %
FIBRINOGEN: 345 MG/DL (ref 207–509)
GFR SERPL CREATININE-BSD FRML MDRD: >60 ML/MIN/{1.73_M2}
GFR SERPL CREATININE-BSD FRML MDRD: >60 ML/MIN/{1.73_M2}
GLUCOSE BLD-MCNC: 83 MG/DL (ref 70–99)
GLUCOSE BLD-MCNC: 88 MG/DL (ref 70–99)
GLUCOSE URINE: NEGATIVE MG/DL
HCT VFR BLD CALC: 31.5 % (ref 36–48)
HCT VFR BLD CALC: 33.2 % (ref 36–48)
HEMOGLOBIN: 10.5 G/DL (ref 12–16)
HEMOGLOBIN: 11 G/DL (ref 12–16)
INR BLD: 1.04 (ref 0.87–1.14)
KETONES, URINE: NEGATIVE MG/DL
LACTATE DEHYDROGENASE: 279 U/L (ref 100–190)
LEUKOCYTE ESTERASE, URINE: NEGATIVE
LYMPHOCYTES ABSOLUTE: 1.6 K/UL (ref 1–5.1)
LYMPHOCYTES ABSOLUTE: 1.8 K/UL (ref 1–5.1)
LYMPHOCYTES RELATIVE PERCENT: 26.2 %
LYMPHOCYTES RELATIVE PERCENT: 28 %
MCH RBC QN AUTO: 28.7 PG (ref 26–34)
MCH RBC QN AUTO: 28.8 PG (ref 26–34)
MCHC RBC AUTO-ENTMCNC: 33.1 G/DL (ref 31–36)
MCHC RBC AUTO-ENTMCNC: 33.2 G/DL (ref 31–36)
MCV RBC AUTO: 86.6 FL (ref 80–100)
MCV RBC AUTO: 87.1 FL (ref 80–100)
MICROSCOPIC EXAMINATION: NORMAL
MONOCYTES ABSOLUTE: 0.5 K/UL (ref 0–1.3)
MONOCYTES ABSOLUTE: 0.6 K/UL (ref 0–1.3)
MONOCYTES RELATIVE PERCENT: 7.9 %
MONOCYTES RELATIVE PERCENT: 9 %
NEUTROPHILS ABSOLUTE: 3.8 K/UL (ref 1.7–7.7)
NEUTROPHILS ABSOLUTE: 3.9 K/UL (ref 1.7–7.7)
NEUTROPHILS RELATIVE PERCENT: 59.6 %
NEUTROPHILS RELATIVE PERCENT: 63.2 %
NITRITE, URINE: NEGATIVE
PDW BLD-RTO: 12.9 % (ref 12.4–15.4)
PDW BLD-RTO: 13 % (ref 12.4–15.4)
PH UA: 6 (ref 5–8)
PLATELET # BLD: 265 K/UL (ref 135–450)
PLATELET # BLD: 275 K/UL (ref 135–450)
PMV BLD AUTO: 7.1 FL (ref 5–10.5)
PMV BLD AUTO: 7.5 FL (ref 5–10.5)
POTASSIUM SERPL-SCNC: 3.9 MMOL/L (ref 3.5–5.1)
POTASSIUM SERPL-SCNC: 4.3 MMOL/L (ref 3.5–5.1)
PROTEIN PROTEIN: 9 MG/DL
PROTEIN UA: NEGATIVE MG/DL
PROTEIN/CREAT RATIO: 0.4 MG/DL
PROTHROMBIN TIME: 13.5 SEC (ref 11.7–14.5)
RBC # BLD: 3.64 M/UL (ref 4–5.2)
RBC # BLD: 3.81 M/UL (ref 4–5.2)
SODIUM BLD-SCNC: 142 MMOL/L (ref 136–145)
SODIUM BLD-SCNC: 143 MMOL/L (ref 136–145)
SPECIFIC GRAVITY UA: 1.01 (ref 1–1.03)
TOTAL PROTEIN: 6.2 G/DL (ref 6.4–8.2)
TOTAL PROTEIN: 6.3 G/DL (ref 6.4–8.2)
URIC ACID, SERUM: 5.7 MG/DL (ref 2.6–6)
URIC ACID, SERUM: 5.9 MG/DL (ref 2.6–6)
URINE TYPE: NORMAL
UROBILINOGEN, URINE: 0.2 E.U./DL
WBC # BLD: 6 K/UL (ref 4–11)
WBC # BLD: 6.5 K/UL (ref 4–11)

## 2022-11-23 PROCEDURE — G0378 HOSPITAL OBSERVATION PER HR: HCPCS

## 2022-11-23 PROCEDURE — 6360000002 HC RX W HCPCS: Performed by: OBSTETRICS & GYNECOLOGY

## 2022-11-23 PROCEDURE — 96365 THER/PROPH/DIAG IV INF INIT: CPT

## 2022-11-23 PROCEDURE — 85025 COMPLETE CBC W/AUTO DIFF WBC: CPT

## 2022-11-23 PROCEDURE — 96366 THER/PROPH/DIAG IV INF ADDON: CPT

## 2022-11-23 PROCEDURE — 96375 TX/PRO/DX INJ NEW DRUG ADDON: CPT

## 2022-11-23 PROCEDURE — 83615 LACTATE (LD) (LDH) ENZYME: CPT

## 2022-11-23 PROCEDURE — 6360000002 HC RX W HCPCS

## 2022-11-23 PROCEDURE — 84550 ASSAY OF BLOOD/URIC ACID: CPT

## 2022-11-23 PROCEDURE — 84156 ASSAY OF PROTEIN URINE: CPT

## 2022-11-23 PROCEDURE — 80053 COMPREHEN METABOLIC PANEL: CPT

## 2022-11-23 PROCEDURE — 85384 FIBRINOGEN ACTIVITY: CPT

## 2022-11-23 PROCEDURE — 82570 ASSAY OF URINE CREATININE: CPT

## 2022-11-23 PROCEDURE — 36415 COLL VENOUS BLD VENIPUNCTURE: CPT

## 2022-11-23 PROCEDURE — 96376 TX/PRO/DX INJ SAME DRUG ADON: CPT

## 2022-11-23 PROCEDURE — 85610 PROTHROMBIN TIME: CPT

## 2022-11-23 PROCEDURE — 2580000003 HC RX 258: Performed by: OBSTETRICS & GYNECOLOGY

## 2022-11-23 PROCEDURE — 85730 THROMBOPLASTIN TIME PARTIAL: CPT

## 2022-11-23 PROCEDURE — 6370000000 HC RX 637 (ALT 250 FOR IP)

## 2022-11-23 PROCEDURE — 81003 URINALYSIS AUTO W/O SCOPE: CPT

## 2022-11-23 PROCEDURE — 99219 PR INITIAL OBSERVATION CARE/DAY 50 MINUTES: CPT | Performed by: OBSTETRICS & GYNECOLOGY

## 2022-11-23 PROCEDURE — 6370000000 HC RX 637 (ALT 250 FOR IP): Performed by: OBSTETRICS & GYNECOLOGY

## 2022-11-23 RX ORDER — ACETAMINOPHEN 500 MG
TABLET ORAL
Status: COMPLETED
Start: 2022-11-23 | End: 2022-11-23

## 2022-11-23 RX ORDER — ACETAMINOPHEN 500 MG
1000 TABLET ORAL EVERY 4 HOURS PRN
Status: DISCONTINUED | OUTPATIENT
Start: 2022-11-23 | End: 2022-11-23 | Stop reason: SDUPTHER

## 2022-11-23 RX ORDER — DOCUSATE SODIUM 100 MG/1
100 CAPSULE, LIQUID FILLED ORAL 2 TIMES DAILY PRN
Status: DISCONTINUED | OUTPATIENT
Start: 2022-11-23 | End: 2022-11-25 | Stop reason: HOSPADM

## 2022-11-23 RX ORDER — OXYCODONE HYDROCHLORIDE 5 MG/1
5 TABLET ORAL EVERY 6 HOURS PRN
Status: DISCONTINUED | OUTPATIENT
Start: 2022-11-23 | End: 2022-11-25 | Stop reason: HOSPADM

## 2022-11-23 RX ORDER — NIFEDIPINE 30 MG/1
30 TABLET, EXTENDED RELEASE ORAL 2 TIMES DAILY
Status: DISCONTINUED | OUTPATIENT
Start: 2022-11-23 | End: 2022-11-25 | Stop reason: HOSPADM

## 2022-11-23 RX ORDER — SODIUM CHLORIDE, SODIUM LACTATE, POTASSIUM CHLORIDE, CALCIUM CHLORIDE 600; 310; 30; 20 MG/100ML; MG/100ML; MG/100ML; MG/100ML
INJECTION, SOLUTION INTRAVENOUS CONTINUOUS
Status: DISCONTINUED | OUTPATIENT
Start: 2022-11-23 | End: 2022-11-25 | Stop reason: HOSPADM

## 2022-11-23 RX ORDER — MAGNESIUM SULFATE IN WATER 40 MG/ML
4000 INJECTION, SOLUTION INTRAVENOUS ONCE
Status: COMPLETED | OUTPATIENT
Start: 2022-11-23 | End: 2022-11-23

## 2022-11-23 RX ORDER — SODIUM CHLORIDE 0.9 % (FLUSH) 0.9 %
5-40 SYRINGE (ML) INJECTION EVERY 12 HOURS SCHEDULED
Status: DISCONTINUED | OUTPATIENT
Start: 2022-11-23 | End: 2022-11-25 | Stop reason: HOSPADM

## 2022-11-23 RX ORDER — SODIUM CHLORIDE 9 MG/ML
INJECTION, SOLUTION INTRAVENOUS PRN
Status: DISCONTINUED | OUTPATIENT
Start: 2022-11-23 | End: 2022-11-25 | Stop reason: HOSPADM

## 2022-11-23 RX ORDER — ONDANSETRON 2 MG/ML
4 INJECTION INTRAMUSCULAR; INTRAVENOUS EVERY 6 HOURS PRN
Status: DISCONTINUED | OUTPATIENT
Start: 2022-11-23 | End: 2022-11-25 | Stop reason: HOSPADM

## 2022-11-23 RX ORDER — ACETAMINOPHEN 500 MG
1000 TABLET ORAL EVERY 8 HOURS PRN
Status: DISCONTINUED | OUTPATIENT
Start: 2022-11-23 | End: 2022-11-25 | Stop reason: HOSPADM

## 2022-11-23 RX ORDER — HYDRALAZINE HYDROCHLORIDE 20 MG/ML
INJECTION INTRAMUSCULAR; INTRAVENOUS
Status: COMPLETED
Start: 2022-11-23 | End: 2022-11-23

## 2022-11-23 RX ORDER — HYDRALAZINE HYDROCHLORIDE 20 MG/ML
5 INJECTION INTRAMUSCULAR; INTRAVENOUS ONCE
Status: COMPLETED | OUTPATIENT
Start: 2022-11-23 | End: 2022-11-23

## 2022-11-23 RX ORDER — SODIUM CHLORIDE 0.9 % (FLUSH) 0.9 %
5-40 SYRINGE (ML) INJECTION PRN
Status: DISCONTINUED | OUTPATIENT
Start: 2022-11-23 | End: 2022-11-25 | Stop reason: HOSPADM

## 2022-11-23 RX ORDER — IBUPROFEN 800 MG/1
800 TABLET ORAL EVERY 8 HOURS PRN
Status: DISCONTINUED | OUTPATIENT
Start: 2022-11-23 | End: 2022-11-25 | Stop reason: HOSPADM

## 2022-11-23 RX ORDER — NIFEDIPINE 10 MG/1
10 CAPSULE ORAL ONCE
Status: COMPLETED | OUTPATIENT
Start: 2022-11-23 | End: 2022-11-23

## 2022-11-23 RX ADMIN — NIFEDIPINE 10 MG: 10 CAPSULE ORAL at 15:32

## 2022-11-23 RX ADMIN — Medication 1000 MG: at 14:51

## 2022-11-23 RX ADMIN — HYDRALAZINE HYDROCHLORIDE 5 MG: 20 INJECTION INTRAMUSCULAR; INTRAVENOUS at 20:42

## 2022-11-23 RX ADMIN — ACETAMINOPHEN 1000 MG: 500 TABLET ORAL at 14:51

## 2022-11-23 RX ADMIN — MAGNESIUM SULFATE HEPTAHYDRATE 4000 MG: 40 INJECTION, SOLUTION INTRAVENOUS at 20:53

## 2022-11-23 RX ADMIN — MAGNESIUM SULFATE HEPTAHYDRATE 2000 MG/HR: 40 INJECTION, SOLUTION INTRAVENOUS at 21:06

## 2022-11-23 RX ADMIN — SODIUM CHLORIDE, POTASSIUM CHLORIDE, SODIUM LACTATE AND CALCIUM CHLORIDE: 600; 310; 30; 20 INJECTION, SOLUTION INTRAVENOUS at 20:45

## 2022-11-23 RX ADMIN — Medication 10 ML: at 20:43

## 2022-11-23 RX ADMIN — NIFEDIPINE 30 MG: 30 TABLET, EXTENDED RELEASE ORAL at 17:52

## 2022-11-23 ASSESSMENT — PAIN SCALES - GENERAL: PAINLEVEL_OUTOF10: 7

## 2022-11-23 ASSESSMENT — PAIN DESCRIPTION - LOCATION: LOCATION: HEAD

## 2022-11-23 ASSESSMENT — PAIN DESCRIPTION - DESCRIPTORS: DESCRIPTORS: ACHING

## 2022-11-23 NOTE — H&P
Obstetrics Admission History and Physical    CC:   Chief Complaint   Patient presents with    Hypertension     Elevated blood pressures at home 177/103, 172/98, 183/113       HPI: Billy Martinez is a 28 y.o. [id number] who presents with complaints of elevated blood pressure at home. Patient is POD#7 from repeat  delivery at term. Pregnancy was complicated by low transverse  on 13 secondary to HELLP, severe pre-eclampsia, Rh negative, COVID-19 in second trimester, and recent UTI---treated with macrobid. Patient was discharged home on 22, was doing well until this AM when she started having elevated BP. Was discharged to home on lisinopril for BP, states this morning was having severe BP (170s/90s). +HA as well (improved with tylenol in triage), no chest pain, SOB, RUQ pain, or blurry vision. Otherwise feeling well, pain controlled from  section as well. Review of Systems: The following ROS was otherwise negative, except as noted in the HPI: constitutional, HEENT, respiratory, cardiovascular, gastrointestinal, genitourinary, skin, musculoskeletal, neurological, psych.      OBGYN Provider : Arnold    Obstetrical History:  OB History    Para Term  AB Living   2 2 1 1 0 2   SAB IAB Ectopic Molar Multiple Live Births   0 0 0 0 0 2      # Outcome Date GA Lbr [place]/2nd Weight Sex Delivery Anes PTL Lv   2 Term 22 37w3d  6 lb 14.1 oz (3.12 kg) F CS-LTranv Spinal N DALTON      Name: Shiloh Constant: 8  Apgar5: 9   1  13 28w6d  2 lb 2.6 oz (0.98 kg) F CS-LTranv EPI Y DALTON      Birth Comments: severe preeclampsia/HELLP      Complications: Pre-eclampsia, Other (Comment), HELLP syndrome      Name: Quinn Sam: 1  Apgar5: 9       Past Medical History:   Past Medical History:   Diagnosis Date    Chronic hypertension     Complication of anesthesia     wakes up \"frantic\" and needs meds to calm her down Diabetes mellitus ([facility] [address]     borderline diabetic prior to pregnancy    Endometriosis in scar of skin     HELLP syndrome     G1    Hx of migraines     Pre-eclampsia      delivery     Rh negative, maternal        Medications:  No current facility-administered medications on file prior to encounter. Current Outpatient Medications on File Prior to Encounter   Medication Sig Dispense Refill    lisinopril (PRINIVIL;ZESTRIL) 10 MG tablet Take 1 tablet by mouth daily 90 tablet 0    docusate sodium (COLACE, DULCOLAX) 100 MG CAPS Take 100 mg by mouth 2 times daily as needed for Constipation 30 capsule 0    ibuprofen (ADVIL;MOTRIN) 800 MG tablet Take 1 tablet by mouth every 8 hours as needed for Pain 40 tablet 0    oxyCODONE (ROXICODONE) 5 MG immediate release tablet Take 1 tablet by mouth every 6 hours as needed for Pain for up to 5 days. 20 tablet 0    Prenatal MV-Min-Fe Fum-FA-DHA (PRENATAL 1 PO) Take by mouth      NIFEdipine (ADALAT CC) 30 MG extended release tablet Take 30 mg by mouth 2 times daily         Allergies:  Cefzil [cefprozil]    Surgical History:  Past Surgical History:   Procedure Laterality Date    ADENOIDECTOMY  age 2    BACK SURGERY      6 surgeries from age [age] related to pilonidal cyst removal on tailbone.      SECTION       SECTION N/A 2022     SE[facility] [address]    TYMPANOPLASTY      repaired as a child       Family History:  Family History   Problem Relation Age of Onset    High Blood Pressure Mother     Cancer Mother     Arthritis Father     Asthma Father     High Blood Pressure Father     Diabetes Maternal Grandmother     Heart Disease Maternal Grandmother     Diabetes Maternal Grandfather     Cancer Paternal Grandmother     Diabetes Paternal Grandmother     High Blood Pressure Paternal Grandmother     Diabetes Paternal Grandfather     Heart Disease Paternal Grandfather     High Blood Pressure Paternal Grandfather Social History:  Social History     Substance and Sexual Activity   Alcohol Use No    Comment: occ     Social History     Substance and Sexual Activity   Drug Use No     Social History     Tobacco Use   Smoking Status Former    Types: Cigarettes   Smokeless Tobacco Never       Physical Exam:  BP (!) 154/77   Pulse (!) 49   Temp 98.3 °F (36.8 °C)   Resp 19   Ht 5' 2\" (1.575 m)   Wt 220 lb (99.8 kg)   LMP 02/27/2022   SpO2 98%   BMI 40.24 kg/m²   Physical Exam  HENT:      Head: Normocephalic. Cardiovascular:      Rate and Rhythm: Normal rate. Pulmonary:      Effort: Pulmonary effort is normal. No respiratory distress. Abdominal:      Palpations: Abdomen is soft. Tenderness: There is no abdominal tenderness. There is no guarding or rebound. Musculoskeletal:      Right lower leg: No edema. Left lower leg: No edema. Neurological:      General: No focal deficit present. Mental Status: She is alert. Deep Tendon Reflexes: Reflexes normal (1+ patellar reflex, no clonus).    Psychiatric:         Mood and Affect: Mood normal.       Labs:  Admission on 11/23/2022   Component Date Value    WBC 11/23/2022 6.0     RBC 11/23/2022 3.64 (A)     Hemoglobin 11/23/2022 10.5 (A)     Hematocrit 11/23/2022 31.5 (A)     MCV 11/23/2022 86.6     MCH 11/23/2022 28.7     MCHC 11/23/2022 33.2     RDW 11/23/2022 12.9     Platelets 75/31/1647 265     MPV 11/23/2022 7.5     Neutrophils % 11/23/2022 63.2     Lymphocytes % 11/23/2022 26.2     Monocytes % 11/23/2022 7.9     Eosinophils % 11/23/2022 2.6     Basophils % 11/23/2022 0.1     Neutrophils Absolute 11/23/2022 3.8     Lymphocytes Absolute 11/23/2022 1.6     Monocytes Absolute 11/23/2022 0.5     Eosinophils Absolute 11/23/2022 0.2     Basophils Absolute 11/23/2022 0.0     Sodium 11/23/2022 143     Potassium 11/23/2022 4.3     Chloride 11/23/2022 108     CO2 11/23/2022 25     Anion Gap 11/23/2022 10     Glucose 11/23/2022 83     BUN 11/23/2022 11 Creatinine 11/23/2022 0.6     Est, Glom Filt Rate 11/23/2022 >60     Calcium 11/23/2022 8.9     Total Protein 11/23/2022 6.2 (A)     Albumin 11/23/2022 3.4     Albumin/Globulin Ratio 11/23/2022 1.2     Total Bilirubin 11/23/2022 <0.2     Alkaline Phosphatase 11/23/2022 92     ALT 11/23/2022 20     AST 11/23/2022 17     Uric Acid, Serum 11/23/2022 5.7     LD 11/23/2022 279 (A)     Color, UA 11/23/2022 Yellow     Clarity, UA 11/23/2022 Clear     Glucose, Ur 11/23/2022 Negative     Bilirubin Urine 11/23/2022 Negative     Ketones, Urine 11/23/2022 Negative     Specific Gravity, UA 11/23/2022 1.010     Blood, Urine 11/23/2022 Negative     pH, UA 11/23/2022 6.0     Protein, UA 11/23/2022 Negative     Urobilinogen, Urine 11/23/2022 0.2     Nitrite, Urine 11/23/2022 Negative     Leukocyte Esterase, Urine 11/23/2022 Negative     Microscopic Examination 11/23/2022 Not Indicated     Urine Type 11/23/2022 NotGiven     Protein, Ur 11/23/2022 9.00     Creatinine, Ur 11/23/2022 22.0 (A)     Protein/Creat Ratio 11/23/2022 0.4        Assessment/Plan:   28 y.o. [id number] POD#7 from RCD on 11/16/22 here with elevated BP    cHTN  - patient with elevated BP today, HELLP labs as above overall wnl however PCR 0.4. Reviewed this with patient, UA with negative protein and PCR only elevated due to very low creatinine and was not clean catch in postpartum period.  Could consider resending PCR with cath specimen if concern for worsening symptoms/developing preE  - Discussed with patient option for prophylactic magnesium now based on BP vs continued obs, will hold magnesium for now given BP improved with PO procardia and asymptomtic  - HA improved with PO tylenol, continue PRN  - will transition patient back to Procardia 30mg XL BID and d/c lisinopril for now  - repeat labs in AM   - seizure precautions and strict I&O    Dispo: admit for obs, BP monitoring, medication management  Mine Gill MD

## 2022-11-23 NOTE — PROGRESS NOTES
Vitals:    11/23/22 1439 11/23/22 1451 11/23/22 1507 11/23/22 1519   BP: (!) 174/112 (!) 162/78 (!) 192/102 (!) 183/91   Pulse: 50 (!) 44 (!) 42 (!) 45   Resp:       SpO2:       Weight:       Height:          RN reviewed blood pressures and pulse with Dr Zion Antunez. Pt is complaining of a head ache. Pt denies epigastric pain, vision changes.  Order for 10mg procardia received

## 2022-11-23 NOTE — PROGRESS NOTES
RN spoke with Dr. Loretta Chirinos and reviewed most recent blood pressure. Order for cbc, cmp, uric acid, pt, ptt, inr and fibrinogen received.  RN to recheck blood pressure in 15 minutes and call back with results

## 2022-11-23 NOTE — PROGRESS NOTES
Report received from REENA Knight Rn. Bedside report given. Introduced myself to pt as her RN for the day. I put my name and phone number on the white board and showed pt how to use her room phone to get a hold of me. Pt was given her plan of care for the day. Call light within reach. Bed in lowest position and wheels are locked. Pt verbalized understanding and denies any further needs at this time. Continue to monitor.

## 2022-11-23 NOTE — TELEPHONE ENCOUNTER
Pt S/P  22.  calling with concern for High B/P's. Readings: 170/103 recheck 20 min later 172/98 last 183/113. Denies Chest pain, No SOB. Slight headache, no visual disturbance. No dizziness. Continues to have swelling to ankles and feet. States cuff was calibrated in our office a while back.  Please advise

## 2022-11-23 NOTE — PROGRESS NOTES
Dr. Rehan Salinas notified by most recently blood pressure. RN to keep monitoring pt's vital signs every 15 minutes.  RN to call back when labs are resulted

## 2022-11-23 NOTE — FLOWSHEET NOTE
Patient presents to triage with reporting having increased BP with HA that started yestersday. Pt rates HA 7/10. States her BP med was changed back to previous medication that she was taking before getting pregnant.   Serial BP's started and will update Dr Clyde Faye on patients arrival

## 2022-11-23 NOTE — TELEPHONE ENCOUNTER
Spoke with pt and advised to report to Triage per Physician direction. Called L&D to make aware of send over.

## 2022-11-23 NOTE — PROGRESS NOTES
Dr. Rivera Late at pt bedside. Plan of care reviewed with pt. Pt is agreeable to staying over night for observation. Orders received.  Continue to monitor

## 2022-11-24 LAB
A/G RATIO: 1.3 (ref 1.1–2.2)
ALBUMIN SERPL-MCNC: 3.4 G/DL (ref 3.4–5)
ALP BLD-CCNC: 89 U/L (ref 40–129)
ALT SERPL-CCNC: 20 U/L (ref 10–40)
ANION GAP SERPL CALCULATED.3IONS-SCNC: 12 MMOL/L (ref 3–16)
AST SERPL-CCNC: 13 U/L (ref 15–37)
BASOPHILS ABSOLUTE: 0.1 K/UL (ref 0–0.2)
BASOPHILS RELATIVE PERCENT: 1.1 %
BILIRUB SERPL-MCNC: <0.2 MG/DL (ref 0–1)
BUN BLDV-MCNC: 8 MG/DL (ref 7–20)
CALCIUM SERPL-MCNC: 7.6 MG/DL (ref 8.3–10.6)
CHLORIDE BLD-SCNC: 105 MMOL/L (ref 99–110)
CO2: 26 MMOL/L (ref 21–32)
CREAT SERPL-MCNC: 0.6 MG/DL (ref 0.6–1.1)
EOSINOPHILS ABSOLUTE: 0.2 K/UL (ref 0–0.6)
EOSINOPHILS RELATIVE PERCENT: 2.6 %
GFR SERPL CREATININE-BSD FRML MDRD: >60 ML/MIN/{1.73_M2}
GLUCOSE BLD-MCNC: 106 MG/DL (ref 70–99)
HCT VFR BLD CALC: 31.8 % (ref 36–48)
HEMOGLOBIN: 10.7 G/DL (ref 12–16)
LYMPHOCYTES ABSOLUTE: 1.3 K/UL (ref 1–5.1)
LYMPHOCYTES RELATIVE PERCENT: 18.8 %
MCH RBC QN AUTO: 28.8 PG (ref 26–34)
MCHC RBC AUTO-ENTMCNC: 33.6 G/DL (ref 31–36)
MCV RBC AUTO: 85.7 FL (ref 80–100)
MONOCYTES ABSOLUTE: 0.6 K/UL (ref 0–1.3)
MONOCYTES RELATIVE PERCENT: 8.3 %
NEUTROPHILS ABSOLUTE: 4.7 K/UL (ref 1.7–7.7)
NEUTROPHILS RELATIVE PERCENT: 69.2 %
PDW BLD-RTO: 13.1 % (ref 12.4–15.4)
PLATELET # BLD: 260 K/UL (ref 135–450)
PMV BLD AUTO: 7.4 FL (ref 5–10.5)
POTASSIUM SERPL-SCNC: 3.6 MMOL/L (ref 3.5–5.1)
RBC # BLD: 3.71 M/UL (ref 4–5.2)
SODIUM BLD-SCNC: 143 MMOL/L (ref 136–145)
TOTAL PROTEIN: 6.1 G/DL (ref 6.4–8.2)
WBC # BLD: 6.7 K/UL (ref 4–11)

## 2022-11-24 PROCEDURE — 36415 COLL VENOUS BLD VENIPUNCTURE: CPT

## 2022-11-24 PROCEDURE — 85025 COMPLETE CBC W/AUTO DIFF WBC: CPT

## 2022-11-24 PROCEDURE — 6370000000 HC RX 637 (ALT 250 FOR IP): Performed by: OBSTETRICS & GYNECOLOGY

## 2022-11-24 PROCEDURE — 6360000002 HC RX W HCPCS: Performed by: OBSTETRICS & GYNECOLOGY

## 2022-11-24 PROCEDURE — 2580000003 HC RX 258: Performed by: OBSTETRICS & GYNECOLOGY

## 2022-11-24 PROCEDURE — G0378 HOSPITAL OBSERVATION PER HR: HCPCS

## 2022-11-24 PROCEDURE — 80053 COMPREHEN METABOLIC PANEL: CPT

## 2022-11-24 PROCEDURE — 99225 PR SBSQ OBSERVATION CARE/DAY 25 MINUTES: CPT | Performed by: OBSTETRICS & GYNECOLOGY

## 2022-11-24 PROCEDURE — 96366 THER/PROPH/DIAG IV INF ADDON: CPT

## 2022-11-24 RX ADMIN — NIFEDIPINE 30 MG: 30 TABLET, EXTENDED RELEASE ORAL at 09:01

## 2022-11-24 RX ADMIN — DOCUSATE SODIUM 100 MG: 100 CAPSULE, LIQUID FILLED ORAL at 09:01

## 2022-11-24 RX ADMIN — ACETAMINOPHEN 1000 MG: 500 TABLET ORAL at 20:58

## 2022-11-24 RX ADMIN — SODIUM CHLORIDE, POTASSIUM CHLORIDE, SODIUM LACTATE AND CALCIUM CHLORIDE: 600; 310; 30; 20 INJECTION, SOLUTION INTRAVENOUS at 09:56

## 2022-11-24 RX ADMIN — IBUPROFEN 800 MG: 800 TABLET, FILM COATED ORAL at 04:52

## 2022-11-24 RX ADMIN — Medication 10 ML: at 20:58

## 2022-11-24 RX ADMIN — DOCUSATE SODIUM 100 MG: 100 CAPSULE, LIQUID FILLED ORAL at 20:58

## 2022-11-24 RX ADMIN — ACETAMINOPHEN 1000 MG: 500 TABLET ORAL at 09:01

## 2022-11-24 RX ADMIN — NIFEDIPINE 30 MG: 30 TABLET, EXTENDED RELEASE ORAL at 20:58

## 2022-11-24 RX ADMIN — IBUPROFEN 800 MG: 800 TABLET, FILM COATED ORAL at 13:18

## 2022-11-24 RX ADMIN — MAGNESIUM SULFATE HEPTAHYDRATE 2000 MG/HR: 40 INJECTION, SOLUTION INTRAVENOUS at 07:13

## 2022-11-24 ASSESSMENT — PAIN DESCRIPTION - DESCRIPTORS
DESCRIPTORS: CRAMPING
DESCRIPTORS: ACHING
DESCRIPTORS: ACHING;SORE

## 2022-11-24 ASSESSMENT — PAIN SCALES - GENERAL
PAINLEVEL_OUTOF10: 2
PAINLEVEL_OUTOF10: 3

## 2022-11-24 ASSESSMENT — PAIN DESCRIPTION - LOCATION
LOCATION: HEAD;INCISION
LOCATION: ABDOMEN
LOCATION: HEAD

## 2022-11-24 NOTE — FLOWSHEET NOTE
BP reading of 172/77 reported to Dr. Italo Hurtado, new orders give for patient to be started on Mag Sulfate, MD to put orders in at this time.

## 2022-11-24 NOTE — PROGRESS NOTES
OB/GYN Progress Note    Subjective:  Pepper Rm is a 28 y.o. [facility] status-post  uncomplicated  on 22, readmitted for postpartum severe superimposed preeclampsia. Pregnancy was complicated by low transverse  on 13 secondary to HELLP, severe pre-eclampsia, Rh negative, COVID-19 in second trimester, and recent UTI---treated with macrobid. Readmitted on 22 with severe range BP and headache. Doing well this AM, just groggy from magnesium. HA has resolved, denies chest pain, SOB, RUQ pain or blurry vision. Pain well controlled from c/section. Voiding, ambulating, otherwise no concerns at this time. Objective:  /81   Pulse 71   Temp 98.1 °F (36.7 °C) (Oral)   Resp 16   Ht 5' 2\" (1.575 m)   Wt 220 lb (99.8 kg)   LMP 2022   SpO2 94%   BMI 40.24 kg/m²     GENERAL APPEARANCE: alert, well appearing, in no apparent distress  LUNGS: clear to auscultation, no wheezes, rales or rhonchi, symmetric air entry  HEART: regular rate and rhythm  ABDOMEN POSTPARTUM: benign non-tender, without masses or organomegaly palpable  EXTREMITIES: no redness or tenderness in the calves or thighs, no edema  NEURO: normal patellar reflexes, no clonus    I/O last 3 completed shifts: In: 1561.4 [P.O.:200; I.V.:1361.4]  Out: 4325 [CWRZW:3136]    Pertinent Labs:   CBC:   Recent Labs     22  1240 22  1847 22  0606   WBC 6.0 6.5 6.7   HGB 10.5* 11.0* 10.7*   HCT 31.5* 33.2* 31.8*    275 260     BMP:    Recent Labs     22  1240 22  1847 22  0606    142 143   K 4.3 3.9 3.6    105 105   CO2 25 28 26   BUN 11 9 8   CREATININE 0.6 0.7 0.6   GLUCOSE 83 88 106*     Hepatic:   Recent Labs     22  1240 22  1847 22  0606   AST 17 14* 13*   ALT 20 19 20   BILITOT <0.2 0.3 <0.2   ALKPHOS 92 87 89     Mag:    No results for input(s): MG in the last 72 hours. Phos:   No results for input(s): PHOS in the last 72 hours.    INR: Recent Labs     22  1847   INR 1.04       Assessment/Plan:  28 y.o.  POD#8 from RCD on 22 here with elevated BP     cHTN with superimposed severe preE  - given PO procardia x1 and IV hydralazine x1 for severe range BP yesterday  - magnesium started , d/c this evening  - HA improved with PO tylenol, continue PRN  - continue Procardia 30mg XL BID and titrate meds as needed moving forward  - repeat labs in AM   - seizure precautions and strict I&O    2.  POD#8  - doing well, routine care    Dispo: d/c home pending BP control    Aidee Bean MD  [facility]

## 2022-11-25 VITALS
RESPIRATION RATE: 16 BRPM | TEMPERATURE: 98.1 F | BODY MASS INDEX: 40.48 KG/M2 | OXYGEN SATURATION: 97 % | HEART RATE: 92 BPM | DIASTOLIC BLOOD PRESSURE: 86 MMHG | SYSTOLIC BLOOD PRESSURE: 141 MMHG | WEIGHT: 220 LBS | HEIGHT: 62 IN

## 2022-11-25 LAB
A/G RATIO: 1.1 (ref 1.1–2.2)
ALBUMIN SERPL-MCNC: 3.3 G/DL (ref 3.4–5)
ALP BLD-CCNC: 91 U/L (ref 40–129)
ALT SERPL-CCNC: 19 U/L (ref 10–40)
ANION GAP SERPL CALCULATED.3IONS-SCNC: 8 MMOL/L (ref 3–16)
AST SERPL-CCNC: 12 U/L (ref 15–37)
BASOPHILS ABSOLUTE: 0.1 K/UL (ref 0–0.2)
BASOPHILS RELATIVE PERCENT: 0.8 %
BILIRUB SERPL-MCNC: <0.2 MG/DL (ref 0–1)
BUN BLDV-MCNC: 9 MG/DL (ref 7–20)
CALCIUM SERPL-MCNC: 8.5 MG/DL (ref 8.3–10.6)
CHLORIDE BLD-SCNC: 105 MMOL/L (ref 99–110)
CO2: 28 MMOL/L (ref 21–32)
CREAT SERPL-MCNC: 0.7 MG/DL (ref 0.6–1.1)
EOSINOPHILS ABSOLUTE: 0.1 K/UL (ref 0–0.6)
EOSINOPHILS RELATIVE PERCENT: 2.2 %
GFR SERPL CREATININE-BSD FRML MDRD: >60 ML/MIN/{1.73_M2}
GLUCOSE BLD-MCNC: 93 MG/DL (ref 70–99)
HCT VFR BLD CALC: 34.5 % (ref 36–48)
HEMOGLOBIN: 11.6 G/DL (ref 12–16)
LYMPHOCYTES ABSOLUTE: 1.3 K/UL (ref 1–5.1)
LYMPHOCYTES RELATIVE PERCENT: 19.8 %
MCH RBC QN AUTO: 29 PG (ref 26–34)
MCHC RBC AUTO-ENTMCNC: 33.5 G/DL (ref 31–36)
MCV RBC AUTO: 86.8 FL (ref 80–100)
MONOCYTES ABSOLUTE: 0.5 K/UL (ref 0–1.3)
MONOCYTES RELATIVE PERCENT: 7.5 %
NEUTROPHILS ABSOLUTE: 4.6 K/UL (ref 1.7–7.7)
NEUTROPHILS RELATIVE PERCENT: 69.7 %
PDW BLD-RTO: 13.3 % (ref 12.4–15.4)
PLATELET # BLD: 290 K/UL (ref 135–450)
PMV BLD AUTO: 7.2 FL (ref 5–10.5)
POTASSIUM SERPL-SCNC: 4 MMOL/L (ref 3.5–5.1)
RBC # BLD: 3.98 M/UL (ref 4–5.2)
SODIUM BLD-SCNC: 141 MMOL/L (ref 136–145)
TOTAL PROTEIN: 6.2 G/DL (ref 6.4–8.2)
WBC # BLD: 6.6 K/UL (ref 4–11)

## 2022-11-25 PROCEDURE — 2580000003 HC RX 258: Performed by: OBSTETRICS & GYNECOLOGY

## 2022-11-25 PROCEDURE — 6370000000 HC RX 637 (ALT 250 FOR IP): Performed by: OBSTETRICS & GYNECOLOGY

## 2022-11-25 PROCEDURE — 80053 COMPREHEN METABOLIC PANEL: CPT

## 2022-11-25 PROCEDURE — 99225 PR SBSQ OBSERVATION CARE/DAY 25 MINUTES: CPT | Performed by: OBSTETRICS & GYNECOLOGY

## 2022-11-25 PROCEDURE — G0378 HOSPITAL OBSERVATION PER HR: HCPCS

## 2022-11-25 PROCEDURE — 85025 COMPLETE CBC W/AUTO DIFF WBC: CPT

## 2022-11-25 PROCEDURE — 36415 COLL VENOUS BLD VENIPUNCTURE: CPT

## 2022-11-25 RX ORDER — NIFEDIPINE 30 MG/1
30 TABLET, FILM COATED, EXTENDED RELEASE ORAL 2 TIMES DAILY
Qty: 60 TABLET | Refills: 2 | Status: SHIPPED | OUTPATIENT
Start: 2022-11-25

## 2022-11-25 RX ORDER — LISINOPRIL 10 MG/1
10 TABLET ORAL DAILY
Status: DISCONTINUED | OUTPATIENT
Start: 2022-11-25 | End: 2022-11-25 | Stop reason: HOSPADM

## 2022-11-25 RX ADMIN — Medication 10 ML: at 08:44

## 2022-11-25 RX ADMIN — LISINOPRIL 10 MG: 10 TABLET ORAL at 11:44

## 2022-11-25 RX ADMIN — NIFEDIPINE 30 MG: 30 TABLET, EXTENDED RELEASE ORAL at 08:43

## 2022-11-25 RX ADMIN — DOCUSATE SODIUM 100 MG: 100 CAPSULE, LIQUID FILLED ORAL at 08:43

## 2022-11-25 NOTE — PROGRESS NOTES
Report received from EWA Irwin RN. Bedside report given. Introduced myself to pt as her RN for the day. I put my name and phone number on the white board and showed pt how to use her room phone to get a hold of me. Pt was given her plan of care for the day. Call light within reach. Bed in lowest position and wheels are locked. Pt verbalized understanding and denies any further needs at this time. Continue to monitor.

## 2022-11-25 NOTE — PROGRESS NOTES
Dr. Celine Zendejas updated via telephone of pt's BP of 185/89 and recheck of 143/85 and that scheduled Procardia dose was given. No change in plan of care at this time.

## 2022-11-25 NOTE — PROGRESS NOTES
OB/GYN Progress Note    Subjective:  Marysol Lemus is a 28 y.o. [facility] status-post  uncomplicated  on 22, readmitted for postpartum severe superimposed preeclampsia. Pregnancy was complicated by low transverse  on 13 secondary to HELLP, severe pre-eclampsia, Rh negative, COVID-19 in second trimester, and recent UTI---treated with macrobid. Readmitted on 22 with severe range BP and headache. Doing well this AM, no complaints. HA has resolved, denies chest pain, SOB, RUQ pain or blurry vision. Pain well controlled from c/section. Voiding, ambulating, otherwise no concerns at this time. Objective:  BP (!) 146/79   Pulse 73   Temp 98.1 °F (36.7 °C) (Oral)   Resp 16   Ht 5' 2\" (1.575 m)   Wt 220 lb (99.8 kg)   LMP 2022   SpO2 97%   BMI 40.24 kg/m²     GENERAL APPEARANCE: alert, well appearing, in no apparent distress  LUNGS: clear to auscultation, no wheezes, rales or rhonchi, symmetric air entry  HEART: regular rate and rhythm  ABDOMEN POSTPARTUM: benign non-tender, without masses or organomegaly palpable  EXTREMITIES: no redness or tenderness in the calves or thighs, no edema  NEURO: normal patellar reflexes, no clonus    I/O last 3 completed shifts: In: 4915.4 [P.O.:2375; I.V.:2540.4]  Out: 8150 [Urine:6775]    Pertinent Labs:   CBC:   Recent Labs     22  0606 22  0655   WBC 6.5 6.7 6.6   HGB 11.0* 10.7* 11.6*   HCT 33.2* 31.8* 34.5*    260 290     BMP:    Recent Labs     22  0606 22  0654    143 141   K 3.9 3.6 4.0    105 105   CO2 28 26 28   BUN 9 8 9   CREATININE 0.7 0.6 0.7   GLUCOSE 88 106* 93     Hepatic:   Recent Labs     22  0606 22  0654   AST 14* 13* 12*   ALT 19 20 19   BILITOT 0.3 <0.2 <0.2   ALKPHOS 87 89 91     Mag:    No results for input(s): MG in the last 72 hours. Phos:   No results for input(s): PHOS in the last 72 hours.    INR:   Recent Labs     22  1847   INR 1.04       Assessment/Plan:  28 y.o.  POD#9 from RCD on 22 here with elevated BP     cHTN with superimposed severe preE  - given PO procardia x1 and IV hydralazine x1 for severe range BP since admission  - s/p magnesium, discontinued 22  - HA improved with PO tylenol, continue PRN  - continue Procardia 30mg XL BID, pt is spiking BP right before dose, will add back in home lisinopril as well  - labs stab le  - seizure precautions and strict I&O  - monitor BP this AM, if normal-mild range after lisinopril will d/c home with f/u in office next week    2.  POD#9  - doing well, routine care    Dispo: d/c home today pending BP control    Kena Heath MD  [facility]

## 2022-11-25 NOTE — DISCHARGE INSTRUCTIONS
Thank you for the opportunity to care for you and your family. We hope that you are happy with the care we provided during your stay in the [facility] [place]. We want to ensure that you have the help that you need when you leave the hospital. If there is anything that we can assist you with please let us know. Breastfeeding mothers may contact our lactation specialists with any problems or questions. [facility] lactation services phone number is [phone]. Leave a message and your call will be returned. Please refer to the information provided in the postpartum care booklet. The following are warning signs to remember. Call 911 if you have:    Chest pain or pressure  Shortness of breath, even at rest  Thoughts of hurting yourself or others  Seizures    Call your healthcare provider if you have:    Temperature of 100.4 degrees or higher  Stitches that are not healing             --Swelling, bleeding, drainage, foul odor, redness or warmth in/around your                 stitches, staples, or incision (scar)               -- Bad smelling blood or discharge from the vagina  Vaginal bleeding that has increased             --Soaking through one pad in an hour             --You are passing clots larger than the size of a lemon. Red, warm tender area(s) in your breast or calf. Headache that does not get better, even after taking medicine; or headache with vision changes    Remember to notify all healthcare providers from your date of delivery up to one year after birth! CARING FOR YOURSELF      DIET/ACTIVITY    Eat a well balanced diet focusing on food high in fiber and protein. Drink plenty of fluids, especially water. To avoid constipation you may take a mild stool softener as recommended by your doctor or midwife. Gradually increase your activity. Resume an exercise regime only after being advised by your doctor or midwife.   When sitting or lying down, keep your legs elevated to reduce swelling. Avoid lifting anything heavier than your baby or a gallon of milk. Avoid driving for two weeks or while taking narcotics. No sexual intercourse for 6 weeks, or until advised by your OB provider. Nothing in the vagina: intercourse, tampons or douching. Be prepared to discuss family planning at your follow up OB visit. If you feel tired and have a lack of energy, you may continue to take your prenatal vitamins. Nap when your baby naps to catch up on sleep. EMOTIONS    You may feel feliz, sad, teary and overwhelmed. Contact your OB provider if you think you may be showing signs of postpartum depression. If your baby will not stop crying, contact another adult to help or place the baby in its crib on its back and take a break. Never shake your baby! INCISIONAL/ANGELICA CARE    Clean your incision in the shower with mild soap. After shower pat the incision area dry and allow it to be open to the air. If used, steri strips should be removed by 2 weeks. If you are discharged with staples in place, call your OB provider's office to schedule removal.  Vaginal bleeding will decrease in amount over the next few weeks. Cleanse your perineum from front to back using the angelica bottle, after toileting, until bleeding stops. You will notice that as your activity increases, your flow may also increase. This is a sign that you need to rest more often. Call your OB provider if you are saturating more than one maxi pad in an hour and rest does not help. BREAST CARE    FOR BREASTFEEDING MOMS:    If you become engorged, feeding may be more difficult or painful for 1 to 2 days. You may find it helpful to hand express some milk so that the infant can latch more easily. While breastfeeding continue to take your prenatal vitamins as directed. Refer to the breastfeeding information in the discharge binder.     FOR NON-BREASTFEEDING MOMS:    You may apply ice packs to your breasts, over your bra, for 20 minutes at a time for comfort. Do not express breast milk. Avoid stimulation to your breasts. When showering, allow the water to strike only your back. Wear a good fitting bra, such as a sports bra, until your milk dries up. OTHER REASONS TO CALL YOUR DOCTOR    Your abdomen is tender to touch or you have pain that cannot be relieved. Flu-like symptoms such as achy muscles and joints or you are experiencing extreme weakness or dizziness. Persistent burning or increased urgency in urination. LITERATURE PROVIDED    For Moms and Those who Care about Them. [facility] [place], [address]. Breastfeeding Best for [place], Best for Mom. [facility] (1-RH) Parent Information about Universal Hearing Screening. Controlling Pain. I have received the educational material listed above,  [facility] [place] [facility] has provided me with the opportunity to view instructional videos pertaining to infant care and the care of myself. I verify that I have received the above information and have no further questions and feel confident to care for myself and my baby. For more information about postpartum care or baby care and feeding, create a [facility] My Chart account, sign in and search using the magnifying glass and type in postpartum, breastfeeding or formula feeding. [email or web address]

## 2022-11-25 NOTE — DISCHARGE SUMMARY
Inpatient Discharge Summary    Admission Diagnosis:    1. Postpartum s/p PLTCD   2. cHTN with superimposed severe preeclampsia  Discharge Diagnosis:    1. same  Procedures: none  Consults: none  Significant Findings: none  Complications: none    Patient was admitted for elevated blood pressures in postpartum period. Noted to be severely hypertensive with headache, started on magnesium for 24 hours. BP improved, monitored off magnesium for approximately 20 hours with mostly mild range BP. Discharged home on Procardia XL 30mg BID and lisinopril 10mg daily.  [place] home BP regularly and call with severe elevations    Discharge Instructions:  Diet:common adult  Activity:activity as tolerated,   Medications:      Medication List        CONTINUE taking these medications      docusate 100 MG Caps  Commonly known as: COLACE, DULCOLAX  Take 100 mg by mouth 2 times daily as needed for Constipation     ibuprofen 800 MG tablet  Commonly known as: ADVIL;MOTRIN  Take 1 tablet by mouth every 8 hours as needed for Pain     lisinopril 10 MG tablet  Commonly known as: PRINIVIL;ZESTRIL  Take 1 tablet by mouth daily     NIFEdipine 30 MG extended release tablet  Commonly known as: ADALAT CC  Take 1 tablet by mouth 2 times daily     PRENATAL 1 PO            STOP taking these medications      oxyCODONE 5 MG immediate release tablet  Commonly known as: Dorethia Bryan               Where to Get Your Medications        You can get these medications from any pharmacy    Bring a paper prescription for each of these medications  NIFEdipine 30 MG extended release tablet           Disposition: Home  Condition on discharge: Stable  Follow up: in 1 week(s) in office for visit and BP check    Margoth Hwoell MD  [facility] OB/[facility]N

## 2022-11-25 NOTE — PROGRESS NOTES
Dr. Lisandro Welch notified via telephone that pt last 2 blood pressures 144/77 and 141/86. Received order to go ahead and discharge pt and have her follow up in the office in about a week.

## 2022-11-25 NOTE — PLAN OF CARE
Problem: Pain  Goal: Verbalizes/displays adequate comfort level or baseline comfort level  Outcome: Progressing  Flowsheets  Taken 11/24/2022 1356 by Carlito Land RN  Verbalizes/displays adequate comfort level or baseline comfort level:   Encourage patient to monitor pain and request assistance   Assess pain using appropriate pain scale  Taken 11/24/2022 0757 by Carlito Land RN  Verbalizes/displays adequate comfort level or baseline comfort level:   Encourage patient to monitor pain and request assistance   Assess pain using appropriate pain scale     Problem: Safety - Adult  Goal: Free from fall injury  Outcome: Progressing

## 2022-11-27 NOTE — PROGRESS NOTES
27 y/o  female at 37 weeks 2 days gestation with [facility] 22 presents for prenatal visit and fetal monitoring. Pregnancy is complicated by low transverse  on 13 secondary to HELLP, severe pre-eclampsia, COVID-19 in second trimester, and recent UTI---treated with macrobid. Medical history is positive for chronic HTN--takes nifedipine 30mg twice daily. Took lisinopril prior to pregnancy. Would like to resume lisinopril postpartum--intends to breastfeed--limited medication passes through breast milk  Home blood pressures:  120-140/, 124/94 this morning  Surgical history is positive for C/Section, surgery to tailbone x 6 and emergency appendectomy (ruptured) in 2019. Interested in repeat --scheduled for 22. Seen by M --[facility] is in network. Blood work was performed--antiphospholipid antibody testing is negative. Sees primary care at [facility] in [facility]. Declines NIPT testing. Denies vaginal bleeding, contractions, pelvic pain, and regular contractions. Admits to fetal movement. Felt fluid--clear--yesterday. Denies headache and RUQ pain. Has issues with vision--distance only  Admits to baseline shortness of breath and constipation   Denies fever, chills, chest pain, nausea, vomiting, diarrhea, dysuria and hematuria. Maternal Wellness Questionnaire reviewed--no concerns. OB ULTRASOUND:  BIOPHYSICAL PROFILE    DATE: 11/15/22    PHYSICIAN: KRISTIN Barrett D.O.    SONOGRAPHER: REENA Liu RDMS    INDICATION: Fetal well being    TYPE OF SCAN: abdominal    BPP: 8/8  Tone: 2, Gross fetal movement: 2, Breathin, Fluid: 2    FINDINGS:  A single viable intrauterine pregnancy is noted in cephalic presentation. Cardiac and somatic activity are noted. The following values were obtained:   Fetal heart rate 157 bpm   Amniotic fluid index 11.36cm    IMPRESSION:   Single live IUP in the third trimester. BPP 8/8. BHAVNA 11.36 cm.     Imaging is limited secondary to fetal position. The patient is well aware of the limitations of ultrasound in the detection of anomalies. Diagnosis Orders   1. Prenatal care, subsequent pregnancy in third trimester  Lactate Dehydrogenase    Uric Acid    CBC with Auto Differential    Comprehensive Metabolic Panel      2. History of hemolysis, elevated liver enzymes, and low platelet (HELLP) syndrome  Lactate Dehydrogenase    Uric Acid    CBC with Auto Differential    Comprehensive Metabolic Panel      3. Chronic hypertension  Lactate Dehydrogenase    Uric Acid    CBC with Auto Differential    Comprehensive Metabolic Panel      4. History of         5. Pre-eclampsia in third trimester        6. Rh negative status during pregnancy in third trimester          Orders Placed This Encounter   Procedures    Lactate Dehydrogenase    Uric Acid    CBC with Auto Differential    Comprehensive Metabolic Panel     Await lab results  Continue to monitor blood pressure. Continue procardia  NPO after midnight  Probable admission in AM for delivery. Pre-eclampsia precautions.

## 2022-11-28 ENCOUNTER — POSTPARTUM VISIT (OUTPATIENT)
Dept: OBGYN CLINIC | Age: 27
End: 2022-11-28

## 2022-11-28 VITALS
SYSTOLIC BLOOD PRESSURE: 134 MMHG | WEIGHT: 210.6 LBS | TEMPERATURE: 98.8 F | BODY MASS INDEX: 38.52 KG/M2 | HEART RATE: 92 BPM | DIASTOLIC BLOOD PRESSURE: 98 MMHG

## 2022-11-28 DIAGNOSIS — I10 CHRONIC HYPERTENSION: ICD-10-CM

## 2022-11-28 DIAGNOSIS — Z98.891 S/P REPEAT LOW TRANSVERSE C-SECTION: ICD-10-CM

## 2022-11-28 DIAGNOSIS — Z87.59 HISTORY OF HEMOLYSIS, ELEVATED LIVER ENZYMES, AND LOW PLATELET (HELLP) SYNDROME: ICD-10-CM

## 2022-11-28 DIAGNOSIS — Z98.891 HISTORY OF C-SECTION: ICD-10-CM

## 2022-11-28 PROCEDURE — 0503F POSTPARTUM CARE VISIT: CPT | Performed by: OBSTETRICS & GYNECOLOGY

## 2022-11-28 RX ORDER — LABETALOL 100 MG/1
100 TABLET, FILM COATED ORAL 3 TIMES DAILY
Qty: 90 TABLET | Refills: 1 | Status: SHIPPED | OUTPATIENT
Start: 2022-11-28

## 2022-12-01 PROBLEM — O14.93 PRE-ECLAMPSIA IN THIRD TRIMESTER: Status: RESOLVED | Noted: 2022-11-16 | Resolved: 2022-12-01

## 2022-12-01 PROBLEM — Z34.83 PRENATAL CARE, SUBSEQUENT PREGNANCY IN THIRD TRIMESTER: Status: RESOLVED | Noted: 2022-07-10 | Resolved: 2022-12-01

## 2022-12-01 ASSESSMENT — ENCOUNTER SYMPTOMS
DIARRHEA: 0
CONSTIPATION: 0
RESPIRATORY NEGATIVE: 1
SHORTNESS OF BREATH: 0
NAUSEA: 0
ABDOMINAL PAIN: 0
GASTROINTESTINAL NEGATIVE: 1
VOMITING: 0

## 2022-12-01 NOTE — PROGRESS NOTES
Subjective:      Patient ID: Aysha Maradiaga is a 28 y.o. female. HPI  27 y/o  female presents for postpartum visit. Patient is 12 days s/p repeat low transverse  and 3 days s/p readmission.  was performed on 22 at 37 weeks 3 days gestation secondary to severe pre-eclampsia. Patient was discharged postoperatively on 22. Patient initially was discharged on lisinopril (pre-pregnancy medication and dose). Patient was readmitted for postpartum severe superimposed pre-eclampsia and course of magnesium sulfate. Patient was subsequently discharged on lisinopril and nifedipine. Since discharge, patient has noticed blood pressures slowly trending up. Pregnancy has been complicated by low transverse  on 13 secondary to HELLP, severe pre-eclampsia, Rh negative, COVID-19 in second trimester, and recent UTI. Patient denies headaches, vision changes and RUQ pain. Denies fever, chills, chest pain, shortness of breath, nausea, vomiting, diarrhea, constipation, dysuria and hematuria. Lochia is limited. Pain control is adequate. Denies calf tenderness. Review of Systems   Constitutional: Negative. Negative for activity change, appetite change, chills, fatigue, fever and unexpected weight change. Eyes:  Negative for visual disturbance. Respiratory: Negative. Negative for shortness of breath. Cardiovascular: Negative. Negative for chest pain. Gastrointestinal: Negative. Negative for abdominal pain, constipation, diarrhea, nausea and vomiting. Endocrine: Negative for cold intolerance and heat intolerance. Genitourinary:  Positive for vaginal bleeding. Negative for difficulty urinating, dysuria, hematuria, pelvic pain, vaginal discharge and vaginal pain. Skin: Negative. Neurological:  Negative for headaches. Hematological:  Does not bruise/bleed easily. Psychiatric/Behavioral: Negative.   Negative for dysphoric mood and suicidal ideas. The patient is not nervous/anxious. Objective:   Physical Exam  Vitals and nursing note reviewed. Constitutional:       General: She is not in acute distress. Appearance: She is well-developed. She is not ill-appearing, toxic-appearing or diaphoretic. Pulmonary:      Effort: Pulmonary effort is normal.   Abdominal:      General: There is no distension. Palpations: Abdomen is soft. Tenderness: There is no abdominal tenderness. There is no guarding. Comments: Incision clean dry intact. No apparent signs of infection or compromise. Skin:     General: Skin is warm and dry. Neurological:      Mental Status: She is alert and oriented to person, place, and time. Psychiatric:         Mood and Affect: Mood normal.         Behavior: Behavior normal.         Thought Content: Thought content normal.         Judgment: Judgment normal.     22  Evenin/101, 136/95  22  Mornin/106, 159/102, 172/104, 167/111, 162/112, 150/105  Afternoon: 163/113, 158/96, 148/108  Evenin/105  22  Mornin/109, 149/112, 148/109  Afternoon: 135/103  Evenin/103, 143/94  22  Mornin/104, 143/117    Assessment:       Diagnosis Orders   1. Postpartum care following  delivery        2. S/P repeat low transverse         3. History of         4. History of hemolysis, elevated liver enzymes, and low platelet (HELLP) syndrome        5. Chronic hypertension                Plan:      Pre-eclampsia precautions  Continue home blood pressure monitoring--parameters provided. Continue lisinopril 10 mg daily  Continue procardia 30 mg XL BID  Start labetalol 100 mg TID. Follow up prn and 1 week for blood pressure check and postpartum visit.            Myla Barrett DO

## 2022-12-06 ENCOUNTER — POSTPARTUM VISIT (OUTPATIENT)
Dept: OBGYN CLINIC | Age: 27
End: 2022-12-06

## 2022-12-06 VITALS
HEART RATE: 95 BPM | BODY MASS INDEX: 38.56 KG/M2 | SYSTOLIC BLOOD PRESSURE: 112 MMHG | WEIGHT: 210.8 LBS | TEMPERATURE: 97.7 F | DIASTOLIC BLOOD PRESSURE: 90 MMHG

## 2022-12-06 DIAGNOSIS — Z87.59 HISTORY OF HEMOLYSIS, ELEVATED LIVER ENZYMES, AND LOW PLATELET (HELLP) SYNDROME: ICD-10-CM

## 2022-12-06 DIAGNOSIS — I10 CHRONIC HYPERTENSION: ICD-10-CM

## 2022-12-06 DIAGNOSIS — Z98.891 S/P REPEAT LOW TRANSVERSE C-SECTION: ICD-10-CM

## 2022-12-06 DIAGNOSIS — E66.9 OBESITY (BMI 30-39.9): ICD-10-CM

## 2022-12-06 DIAGNOSIS — Z98.891 HISTORY OF C-SECTION: ICD-10-CM

## 2022-12-11 PROBLEM — E66.9 OBESITY (BMI 30-39.9): Status: ACTIVE | Noted: 2022-12-11

## 2022-12-11 ASSESSMENT — ENCOUNTER SYMPTOMS
VOMITING: 0
DIARRHEA: 0
ABDOMINAL PAIN: 0
CONSTIPATION: 0
SHORTNESS OF BREATH: 0
NAUSEA: 0
RESPIRATORY NEGATIVE: 1
GASTROINTESTINAL NEGATIVE: 1

## 2022-12-11 NOTE — PROGRESS NOTES
Subjective:      Patient ID: Cynthia Moore is a 28 y.o. female. HPI  27 y/o  female presents for postpartum visit. Patient is 3 weeks s/p repeat low transverse . Postpartum course was complicated by re-admission for postpartum severe superimposed pre-eclampsia and course of Magnesium.  was performed on 22 at 37 weeks 3 days gestation secondary to severe pre-eclampsia. Patient initially was discharged on lisinopril (pre-pregnancy medication and dose). With subsequent admission, patient was discharged on lisinopril and nifedipine. Blood pressures have essentially been stable since last visit. Rx for labetalol added last visit. Patient took labetalol 1x on --experienced significant side effects. Hasn't taken labetalol since. Blood pressures:  110-130's/70-90's. Pregnancy was complicated by history of low transverse  on 13 secondary to HELLP, severe pre-eclampsia, Rh negative, COVID-19 in second trimester, and UTI. Bleeding resolved and has recently restarted--mild. Patient denies headaches, vision changes and RUQ pain. Denies fever, chills, chest pain, shortness of breath, nausea, vomiting, diarrhea, constipation, dysuria and hematuria. Pain control is adequate. Denies calf tenderness. Review of Systems   Constitutional: Negative. Negative for activity change, appetite change, chills, fatigue, fever and unexpected weight change. Eyes:  Negative for visual disturbance. Respiratory: Negative. Negative for shortness of breath. Cardiovascular: Negative. Negative for chest pain. Gastrointestinal: Negative. Negative for abdominal pain, constipation, diarrhea, nausea and vomiting. Endocrine: Negative for cold intolerance and heat intolerance. Genitourinary:  Positive for vaginal bleeding. Negative for difficulty urinating, dysuria, hematuria, pelvic pain, vaginal discharge and vaginal pain. Skin: Negative. Neurological:  Negative for headaches. Hematological:  Does not bruise/bleed easily. Psychiatric/Behavioral: Negative. Negative for dysphoric mood and suicidal ideas. The patient is not nervous/anxious. Objective:   Physical Exam  Vitals and nursing note reviewed. Exam conducted with a chaperone present. Constitutional:       General: She is not in acute distress. Appearance: Normal appearance. She is well-developed. She is not ill-appearing, toxic-appearing or diaphoretic. Pulmonary:      Effort: Pulmonary effort is normal.   Abdominal:      General: There is no distension. Tenderness: There is no abdominal tenderness. There is no guarding. Comments: Incision clean dry intact. No apparent signs of infection or compromise. Skin:     General: Skin is warm and dry. Neurological:      Mental Status: She is alert and oriented to person, place, and time. Psychiatric:         Mood and Affect: Mood normal.         Behavior: Behavior normal.         Thought Content: Thought content normal.         Judgment: Judgment normal.       Assessment:       Diagnosis Orders   1. Postpartum care following  delivery        2. S/P repeat low transverse         3. History of hemolysis, elevated liver enzymes, and low platelet (HELLP) syndrome        4. Chronic hypertension        5. History of         6. Lactating mother        9. Obesity (BMI 30-39. 9)                Plan:      Continue current antihypertensive regimen  Continue to monitor blood pressures  Concerned for milk supply--consider adding Reglan  Options for contraception reviewed--patient interested in mini-pill--probable Rx next visit. Follow up prn and 3 weeks for prenatal visit.            Nevaeh Barrett DO

## 2023-01-03 ENCOUNTER — POSTPARTUM VISIT (OUTPATIENT)
Dept: OBGYN CLINIC | Age: 28
End: 2023-01-03

## 2023-01-03 VITALS
DIASTOLIC BLOOD PRESSURE: 88 MMHG | SYSTOLIC BLOOD PRESSURE: 130 MMHG | TEMPERATURE: 98.2 F | HEART RATE: 98 BPM | WEIGHT: 211 LBS | BODY MASS INDEX: 38.59 KG/M2

## 2023-01-03 DIAGNOSIS — Z98.891 HISTORY OF C-SECTION: ICD-10-CM

## 2023-01-03 DIAGNOSIS — Z87.59 HISTORY OF HEMOLYSIS, ELEVATED LIVER ENZYMES, AND LOW PLATELET (HELLP) SYNDROME: ICD-10-CM

## 2023-01-03 DIAGNOSIS — I10 CHRONIC HYPERTENSION: ICD-10-CM

## 2023-01-03 DIAGNOSIS — Z98.891 S/P REPEAT LOW TRANSVERSE C-SECTION: ICD-10-CM

## 2023-01-03 DIAGNOSIS — E66.9 OBESITY (BMI 30-39.9): ICD-10-CM

## 2023-01-03 PROCEDURE — 0503F POSTPARTUM CARE VISIT: CPT | Performed by: OBSTETRICS & GYNECOLOGY

## 2023-01-03 RX ORDER — ACETAMINOPHEN AND CODEINE PHOSPHATE 120; 12 MG/5ML; MG/5ML
1 SOLUTION ORAL DAILY
Qty: 90 TABLET | Refills: 3 | Status: SHIPPED | OUTPATIENT
Start: 2023-01-03

## 2023-01-05 ASSESSMENT — ENCOUNTER SYMPTOMS
VOMITING: 0
CONSTIPATION: 0
SHORTNESS OF BREATH: 0
NAUSEA: 0
ABDOMINAL PAIN: 0
RESPIRATORY NEGATIVE: 1
DIARRHEA: 0
GASTROINTESTINAL NEGATIVE: 1

## 2023-01-06 NOTE — PROGRESS NOTES
Subjective:      Patient ID: Ezella Lesches is a 28 y.o. female. HPI  29 y/o  female presents for postpartum visit. Patient is 8 weeks s/p repeat low transverse . Postpartum course was complicated by re-admission for postpartum severe superimposed pre-eclampsia and course of Magnesium.  was performed on 22 at 37 weeks 3 days gestation secondary to severe pre-eclampsia. Patient initially was discharged on lisinopril (pre-pregnancy medication and dose). With subsequent admission, patient was discharged on lisinopril and nifedipine. Current medications include Procardia XL 30 mg daily and Lisinopril 10 mg daily. Denies headaches, vision changes, and RUQ pain. Home blood pressures when taken have been stable. Pregnancy was complicated by history of low transverse  on 13 secondary to HELLP, severe pre-eclampsia, Rh negative, COVID-19 in second trimester, and UTI. Admits to short menses last week--no issues. Denies fever, chills, chest pain, shortness of breath, nausea, vomiting, diarrhea, constipation, dysuria and hematuria. Last pap smear: 22--normal pap smear. Review of Systems   Constitutional: Negative. Negative for activity change, appetite change, chills, fatigue, fever and unexpected weight change. Eyes:  Negative for visual disturbance. Respiratory: Negative. Negative for shortness of breath. Cardiovascular: Negative. Negative for chest pain. Gastrointestinal: Negative. Negative for abdominal pain, constipation, diarrhea, nausea and vomiting. Endocrine: Negative for cold intolerance and heat intolerance. Genitourinary:  Negative for difficulty urinating, dysuria, hematuria, pelvic pain, vaginal bleeding, vaginal discharge and vaginal pain. Skin: Negative. Neurological:  Negative for headaches. Hematological:  Does not bruise/bleed easily. Psychiatric/Behavioral: Negative.   Negative for dysphoric mood and suicidal ideas. The patient is not nervous/anxious. Objective:   Physical Exam  Vitals and nursing note reviewed. Constitutional:       General: She is not in acute distress. Appearance: Normal appearance. She is well-developed. She is not ill-appearing, toxic-appearing or diaphoretic. Pulmonary:      Effort: Pulmonary effort is normal.   Abdominal:      General: There is no distension. Palpations: Abdomen is soft. Tenderness: There is no abdominal tenderness. There is no guarding. Comments: Incision clean dry intact. Well healed   Skin:     General: Skin is warm and dry. Neurological:      Mental Status: She is alert and oriented to person, place, and time. Psychiatric:         Mood and Affect: Mood normal.         Behavior: Behavior normal.         Thought Content: Thought content normal.         Judgment: Judgment normal.       Assessment:       Diagnosis Orders   1. Postpartum care following  delivery        2. S/P repeat low transverse         3. Lactating mother        4. Chronic hypertension        5. History of         6. History of hemolysis, elevated liver enzymes, and low platelet (HELLP) syndrome        7. Obesity (BMI 30-39. 9)                Plan:      Options for contraception reviewed. Risks and benefits discussed. Rx micronor  Continue lisinopril and procardia  Follow up with primary care in the next 1-2 months to review chronic HTN and treatment  Follow up prn and 1 year for well woman examination.            Catherine Barrett DO

## 2023-10-27 ENCOUNTER — OFFICE VISIT (OUTPATIENT)
Dept: OBGYN CLINIC | Age: 28
End: 2023-10-27

## 2023-10-27 ENCOUNTER — TELEPHONE (OUTPATIENT)
Dept: OBGYN CLINIC | Age: 28
End: 2023-10-27

## 2023-10-27 VITALS
HEART RATE: 101 BPM | TEMPERATURE: 97.7 F | DIASTOLIC BLOOD PRESSURE: 72 MMHG | WEIGHT: 216.2 LBS | BODY MASS INDEX: 39.54 KG/M2 | SYSTOLIC BLOOD PRESSURE: 112 MMHG

## 2023-10-27 DIAGNOSIS — I10 CHRONIC HYPERTENSION: ICD-10-CM

## 2023-10-27 DIAGNOSIS — Z01.419 WOMEN'S ANNUAL ROUTINE GYNECOLOGICAL EXAMINATION: Primary | ICD-10-CM

## 2023-10-27 DIAGNOSIS — Z30.011 ENCOUNTER FOR INITIAL PRESCRIPTION OF CONTRACEPTIVE PILLS: ICD-10-CM

## 2023-10-27 DIAGNOSIS — Z12.4 PAP SMEAR FOR CERVICAL CANCER SCREENING: ICD-10-CM

## 2023-10-27 DIAGNOSIS — Z87.59 HISTORY OF HEMOLYSIS, ELEVATED LIVER ENZYMES, AND LOW PLATELET (HELLP) SYNDROME: ICD-10-CM

## 2023-10-27 DIAGNOSIS — Z98.891 HISTORY OF C-SECTION: ICD-10-CM

## 2023-10-27 DIAGNOSIS — E66.9 OBESITY (BMI 30-39.9): ICD-10-CM

## 2023-10-27 RX ORDER — ACETAMINOPHEN AND CODEINE PHOSPHATE 120; 12 MG/5ML; MG/5ML
1 SOLUTION ORAL DAILY
Qty: 90 TABLET | Refills: 3 | Status: SHIPPED | OUTPATIENT
Start: 2023-10-27

## 2023-10-28 PROBLEM — Z30.011 ENCOUNTER FOR INITIAL PRESCRIPTION OF CONTRACEPTIVE PILLS: Status: ACTIVE | Noted: 2023-10-28

## 2023-10-28 ASSESSMENT — ENCOUNTER SYMPTOMS
NAUSEA: 0
SHORTNESS OF BREATH: 0
DIARRHEA: 0
ABDOMINAL PAIN: 0
VOMITING: 0
ABDOMINAL DISTENTION: 0
CONSTIPATION: 0

## 2023-10-28 NOTE — PROGRESS NOTES
Subjective:      Patient ID: Celestino Mcdaniels is a 33 y.o. female. HPI  32 y/o  female presents for well woman examination. Last pap smear was 22--normal.  Menarche occurred age 12/14. Menses occur every month x 6-7 days, heavier than in the past, no dysmenorrhea. Sexually active, no problems. Has been utilizing timing and condoms for contraception. Interested in starting OCPs--[facility]. Stopped breast feeding at 6 months postpartum. History is positive for chronic HTN--currently taking Lisinopril 40 mg daily. Patient is 11 months s/p repeat low transverse . Postpartum course was complicated by re-admission for postpartum severe superimposed pre-eclampsia requiring Magnesium treatment.  was performed on 22 at 37 weeks 3 days gestation secondary to severe pre-eclampsia. History is positive for low transverse  on 13 secondary to HELLP, severe pre-eclampsia as well. Patient states she is not interested in future child bearing but is not entirely ready for a permanent solution (ideally vasectomy). Surgical history is positive for C/Section x 2, surgery to tailbone x 6 and emergency appendectomy (ruptured) in 2019. Denies history of ovarian cysts, uterine fibroids, pelvic infections and endometriosis. Review of Systems   Constitutional:  Negative for activity change, appetite change, chills, fatigue, fever and unexpected weight change. Respiratory:  Negative for shortness of breath. Cardiovascular:  Negative for chest pain. Gastrointestinal:  Negative for abdominal distention, abdominal pain, constipation, diarrhea, nausea and vomiting. Endocrine: Negative for cold intolerance and heat intolerance. Genitourinary:  Negative for difficulty urinating, dyspareunia, dysuria, frequency, genital sores, hematuria, menstrual problem, pelvic pain, vaginal bleeding, vaginal discharge and vaginal pain. Skin:  Negative for rash.    Neurological:

## 2024-02-17 ENCOUNTER — APPOINTMENT (OUTPATIENT)
Dept: ULTRASOUND IMAGING | Age: 29
End: 2024-02-17
Payer: COMMERCIAL

## 2024-02-17 ENCOUNTER — HOSPITAL ENCOUNTER (EMERGENCY)
Age: 29
Discharge: HOME | End: 2024-02-18
Attending: EMERGENCY MEDICINE
Payer: COMMERCIAL

## 2024-02-17 ENCOUNTER — APPOINTMENT (OUTPATIENT)
Dept: CT IMAGING | Age: 29
End: 2024-02-17
Payer: COMMERCIAL

## 2024-02-17 DIAGNOSIS — E16.2 HYPOGLYCEMIA: ICD-10-CM

## 2024-02-17 DIAGNOSIS — K80.20 CALCULUS OF GALLBLADDER WITHOUT CHOLECYSTITIS WITHOUT OBSTRUCTION: ICD-10-CM

## 2024-02-17 DIAGNOSIS — E87.6 HYPOKALEMIA: ICD-10-CM

## 2024-02-17 DIAGNOSIS — R11.0 NAUSEA: ICD-10-CM

## 2024-02-17 DIAGNOSIS — R10.13 ABDOMINAL PAIN, EPIGASTRIC: Primary | ICD-10-CM

## 2024-02-17 LAB
ALBUMIN SERPL-MCNC: 4.6 G/DL (ref 3.4–5)
ALBUMIN/GLOB SERPL: 1.6 {RATIO} (ref 1.1–2.2)
ALP SERPL-CCNC: 75 U/L (ref 40–129)
ALT SERPL-CCNC: 57 U/L (ref 10–40)
ANION GAP SERPL CALCULATED.3IONS-SCNC: 10 MMOL/L (ref 3–16)
AST SERPL-CCNC: 72 U/L (ref 15–37)
BASOPHILS # BLD: 0.1 K/UL (ref 0–0.2)
BASOPHILS NFR BLD: 0.8 %
BILIRUB SERPL-MCNC: 0.4 MG/DL (ref 0–1)
BILIRUB UR QL STRIP.AUTO: ABNORMAL
BUN SERPL-MCNC: 12 MG/DL (ref 7–20)
CALCIUM SERPL-MCNC: 9.2 MG/DL (ref 8.3–10.6)
CHLORIDE SERPL-SCNC: 101 MMOL/L (ref 99–110)
CLARITY UR: ABNORMAL
CO2 SERPL-SCNC: 27 MMOL/L (ref 21–32)
COLOR UR: YELLOW
CREAT SERPL-MCNC: 0.7 MG/DL (ref 0.6–1.1)
DEPRECATED RDW RBC AUTO: 13.2 % (ref 12.4–15.4)
EOSINOPHIL # BLD: 0.1 K/UL (ref 0–0.6)
EOSINOPHIL NFR BLD: 1.2 %
EPI CELLS #/AREA URNS HPF: ABNORMAL /HPF (ref 0–5)
FLUAV RNA RESP QL NAA+PROBE: NOT DETECTED
FLUBV RNA RESP QL NAA+PROBE: NOT DETECTED
GFR SERPLBLD CREATININE-BSD FMLA CKD-EPI: >60 ML/MIN/{1.73_M2}
GLUCOSE SERPL-MCNC: 59 MG/DL (ref 70–99)
GLUCOSE UR STRIP.AUTO-MCNC: NEGATIVE MG/DL
HCG SERPL QL: NEGATIVE
HCT VFR BLD AUTO: 41.3 % (ref 36–48)
HGB BLD-MCNC: 13.7 G/DL (ref 12–16)
HGB UR QL STRIP.AUTO: ABNORMAL
KETONES UR STRIP.AUTO-MCNC: ABNORMAL MG/DL
LEUKOCYTE ESTERASE UR QL STRIP.AUTO: ABNORMAL
LIPASE SERPL-CCNC: 29 U/L (ref 13–60)
LYMPHOCYTES # BLD: 1.4 K/UL (ref 1–5.1)
LYMPHOCYTES NFR BLD: 11.5 %
MAGNESIUM SERPL-MCNC: 2.1 MG/DL (ref 1.8–2.4)
MCH RBC QN AUTO: 28.9 PG (ref 26–34)
MCHC RBC AUTO-ENTMCNC: 33.2 G/DL (ref 31–36)
MCV RBC AUTO: 87.1 FL (ref 80–100)
MONOCYTES # BLD: 0.8 K/UL (ref 0–1.3)
MONOCYTES NFR BLD: 6.2 %
NEUTROPHILS # BLD: 9.8 K/UL (ref 1.7–7.7)
NEUTROPHILS NFR BLD: 80.3 %
NITRITE UR QL STRIP.AUTO: NEGATIVE
PH UR STRIP.AUTO: 7.5 [PH] (ref 5–8)
PLATELET # BLD AUTO: 273 K/UL (ref 135–450)
PMV BLD AUTO: 7.8 FL (ref 5–10.5)
POTASSIUM SERPL-SCNC: 3.4 MMOL/L (ref 3.5–5.1)
PROT SERPL-MCNC: 7.5 G/DL (ref 6.4–8.2)
PROT UR STRIP.AUTO-MCNC: ABNORMAL MG/DL
RBC # BLD AUTO: 4.74 M/UL (ref 4–5.2)
RBC #/AREA URNS HPF: >100 /HPF (ref 0–4)
SARS-COV-2 RNA RESP QL NAA+PROBE: NOT DETECTED
SODIUM SERPL-SCNC: 138 MMOL/L (ref 136–145)
SP GR UR STRIP.AUTO: 1.02 (ref 1–1.03)
UA COMPLETE W REFLEX CULTURE PNL UR: ABNORMAL
UA DIPSTICK W REFLEX MICRO PNL UR: YES
URN SPEC COLLECT METH UR: ABNORMAL
UROBILINOGEN UR STRIP-ACNC: 2 E.U./DL
WBC # BLD AUTO: 12.2 K/UL (ref 4–11)
WBC #/AREA URNS HPF: ABNORMAL /HPF (ref 0–5)

## 2024-02-17 PROCEDURE — 81001 URINALYSIS AUTO W/SCOPE: CPT

## 2024-02-17 PROCEDURE — 83690 ASSAY OF LIPASE: CPT

## 2024-02-17 PROCEDURE — 80053 COMPREHEN METABOLIC PANEL: CPT

## 2024-02-17 PROCEDURE — 96375 TX/PRO/DX INJ NEW DRUG ADDON: CPT

## 2024-02-17 PROCEDURE — 84703 CHORIONIC GONADOTROPIN ASSAY: CPT

## 2024-02-17 PROCEDURE — 87636 SARSCOV2 & INF A&B AMP PRB: CPT

## 2024-02-17 PROCEDURE — 85025 COMPLETE CBC W/AUTO DIFF WBC: CPT

## 2024-02-17 PROCEDURE — 99285 EMERGENCY DEPT VISIT HI MDM: CPT

## 2024-02-17 PROCEDURE — 74177 CT ABD & PELVIS W/CONTRAST: CPT

## 2024-02-17 PROCEDURE — 83735 ASSAY OF MAGNESIUM: CPT

## 2024-02-17 PROCEDURE — 2580000003 HC RX 258: Performed by: PHYSICIAN ASSISTANT

## 2024-02-17 PROCEDURE — 6360000004 HC RX CONTRAST MEDICATION: Performed by: PHYSICIAN ASSISTANT

## 2024-02-17 PROCEDURE — 6360000002 HC RX W HCPCS: Performed by: PHYSICIAN ASSISTANT

## 2024-02-17 PROCEDURE — 96374 THER/PROPH/DIAG INJ IV PUSH: CPT

## 2024-02-17 PROCEDURE — 76705 ECHO EXAM OF ABDOMEN: CPT

## 2024-02-17 PROCEDURE — 6370000000 HC RX 637 (ALT 250 FOR IP): Performed by: PHYSICIAN ASSISTANT

## 2024-02-17 RX ORDER — ONDANSETRON 2 MG/ML
4 INJECTION INTRAMUSCULAR; INTRAVENOUS ONCE
Status: COMPLETED | OUTPATIENT
Start: 2024-02-17 | End: 2024-02-17

## 2024-02-17 RX ORDER — ONDANSETRON 4 MG/1
4 TABLET, FILM COATED ORAL EVERY 8 HOURS PRN
Qty: 20 TABLET | Refills: 0 | Status: SHIPPED | OUTPATIENT
Start: 2024-02-17 | End: 2024-02-18 | Stop reason: SDUPTHER

## 2024-02-17 RX ORDER — DICYCLOMINE HYDROCHLORIDE 10 MG/1
10 CAPSULE ORAL 4 TIMES DAILY PRN
Qty: 28 CAPSULE | Refills: 0 | Status: SHIPPED | OUTPATIENT
Start: 2024-02-17 | End: 2024-02-18

## 2024-02-17 RX ORDER — KETOROLAC TROMETHAMINE 30 MG/ML
15 INJECTION, SOLUTION INTRAMUSCULAR; INTRAVENOUS ONCE
Status: COMPLETED | OUTPATIENT
Start: 2024-02-17 | End: 2024-02-17

## 2024-02-17 RX ORDER — BLOOD-GLUCOSE METER
1 KIT MISCELLANEOUS DAILY PRN
Qty: 1 KIT | Refills: 0 | Status: SHIPPED | OUTPATIENT
Start: 2024-02-17 | End: 2024-03-18

## 2024-02-17 RX ORDER — 0.9 % SODIUM CHLORIDE 0.9 %
1000 INTRAVENOUS SOLUTION INTRAVENOUS ONCE
Status: COMPLETED | OUTPATIENT
Start: 2024-02-17 | End: 2024-02-18

## 2024-02-17 RX ADMIN — SODIUM CHLORIDE 1000 ML: 9 INJECTION, SOLUTION INTRAVENOUS at 23:14

## 2024-02-17 RX ADMIN — ONDANSETRON 4 MG: 2 INJECTION INTRAMUSCULAR; INTRAVENOUS at 23:15

## 2024-02-17 RX ADMIN — POTASSIUM BICARBONATE 20 MEQ: 782 TABLET, EFFERVESCENT ORAL at 23:55

## 2024-02-17 RX ADMIN — IOPAMIDOL 75 ML: 755 INJECTION, SOLUTION INTRAVENOUS at 23:44

## 2024-02-17 RX ADMIN — KETOROLAC TROMETHAMINE 15 MG: 30 INJECTION INTRAMUSCULAR; INTRAVENOUS at 23:14

## 2024-02-17 ASSESSMENT — PAIN SCALES - GENERAL
PAINLEVEL_OUTOF10: 6
PAINLEVEL_OUTOF10: 8

## 2024-02-17 ASSESSMENT — PAIN DESCRIPTION - ORIENTATION: ORIENTATION: OTHER (COMMENT)

## 2024-02-17 ASSESSMENT — PAIN - FUNCTIONAL ASSESSMENT: PAIN_FUNCTIONAL_ASSESSMENT: 0-10

## 2024-02-17 ASSESSMENT — PAIN DESCRIPTION - DESCRIPTORS: DESCRIPTORS: CRAMPING

## 2024-02-17 ASSESSMENT — PAIN DESCRIPTION - LOCATION
LOCATION: ABDOMEN
LOCATION: ABDOMEN

## 2024-02-18 VITALS
HEIGHT: 62 IN | TEMPERATURE: 97.7 F | BODY MASS INDEX: 39.93 KG/M2 | SYSTOLIC BLOOD PRESSURE: 121 MMHG | WEIGHT: 217 LBS | OXYGEN SATURATION: 97 % | RESPIRATION RATE: 16 BRPM | HEART RATE: 88 BPM | DIASTOLIC BLOOD PRESSURE: 67 MMHG

## 2024-02-18 LAB
GLUCOSE BLD-MCNC: 109 MG/DL (ref 70–99)
PERFORMED ON: ABNORMAL

## 2024-02-18 RX ORDER — ONDANSETRON 4 MG/1
4 TABLET, ORALLY DISINTEGRATING ORAL 3 TIMES DAILY PRN
Qty: 9 TABLET | Refills: 0 | Status: SHIPPED | OUTPATIENT
Start: 2024-02-18 | End: 2024-02-21

## 2024-02-18 RX ORDER — HYDROCODONE BITARTRATE AND ACETAMINOPHEN 5; 325 MG/1; MG/1
1 TABLET ORAL EVERY 6 HOURS PRN
Qty: 12 TABLET | Refills: 0 | Status: SHIPPED | OUTPATIENT
Start: 2024-02-18 | End: 2024-02-21

## 2024-02-18 NOTE — ED PROVIDER NOTES
[facility]  ED  EMERGENCY DEPARTMENT ENCOUNTER        Pt Name: Jie Barrett  MRN: [id number]  Birthdate 1995  Date of evaluation: 2/17/2024  Provider: Sobeida Rebolledo PA-C  PCP: Caryn Sanders APRN - CNP  Note Started: 9:57 PM EST 2/17/24      HARRIS. I have evaluated this patient.  With attending provider       CHIEF COMPLAINT       Chief Complaint   Patient presents with    Abdominal Pain     After dinner tonight, began having abdominal pain, cramping, nausea        HISTORY OF PRESENT ILLNESS: 1 or more Elements     History from : Patient    Limitations to history : None    Jie Barrett is a 29 y.o. female with a past medical history of hypertension, obesity who presents to the emergency room for evaluation sudden onset of sharp epigastric abdominal pain cramping since initial onset.  Associated with nausea.  Symptoms came on after eating dinner tonight.  Patient advised she had Posta with chicken breast and bladder on the noodles.  Patient reports she had a similar episode of pain that was self-limiting earlier this week and did not think anything of it.  But had significant worsening of intensity of the pain this evening, and did not feel like it was improving.  She did not take anything prior to coming into the ER.  She denies any vomiting or diarrhea.  Patient is status post appendectomy     Nursing Notes were all reviewed and agreed with or any disagreements were addressed in the HPI.    REVIEW OF SYSTEMS :      Review of Systems   Constitutional:  Negative for chills and fever.   HENT: Negative.  Negative for congestion, rhinorrhea, sinus pressure, sinus pain and sore throat.    Eyes:  Negative for discharge, redness and visual disturbance.   Respiratory:  Negative for cough, chest tightness and shortness of breath.    Cardiovascular:  Negative for chest pain and palpitations.   Gastrointestinal:  Positive for abdominal pain and nausea. Negative for constipation, diarrhea

## 2024-02-18 NOTE — ED PROVIDER NOTES
Emergency Department Provider Note     Location: [facility]  ED  2/17/2024    I independently performed a history and physical on Jie Barrett.   All diagnostic, treatment, and disposition decisions were made by myself in conjunction with the mid-level provider.     Jie Barrett is a 29 y.o. female here for upper abdominal pain that started after she ate some pasta with butter.  Patient reported a similar episode of pain that was brief and self-limiting earlier in the week.  She denies fever.  No vomiting.  No diarrhea.      ED Triage Vitals [02/17/24 2129]   BP Temp Temp Source Pulse Respirations SpO2 Height Weight - Scale   (!) 147/85 97.7 °F (36.5 °C) Oral 84 18 100 % 1.575 m (5' 2\") 98.4 kg (217 lb)        Exam showed WDWN female awake, alert, no facial droop, no slurred speech, heart RRR, lungs clear, abdomen soft, nondistended, epigastric tenderness.  No guarding.    ED course/MDM  Patient seen and examined in room 20    Radiology  CT ABDOMEN PELVIS W IV CONTRAST Additional Contrast? None    Result Date: 2/18/2024  EXAMINATION: CT OF THE ABDOMEN AND PELVIS WITH CONTRAST 2/17/2024 11:42 pm TECHNIQUE: CT of the abdomen and pelvis was performed with the administration of intravenous contrast. Multiplanar reformatted images are provided for review. Automated exposure control, iterative reconstruction, and/or weight based adjustment of the mA/kV was utilized to reduce the radiation dose to as low as reasonably achievable. COMPARISON: None HISTORY: ORDERING SYSTEM PROVIDED HISTORY: epigastric pain TECHNOLOGIST PROVIDED HISTORY: If patient is on cardiac monitor and/or pulse ox, they may be taken off cardiac monitor and pulse ox, left on O2 if currently on. All monitors reattached when patient returns to room. Additional Contrast?->None Reason for exam:->epigastric pain Reason for Exam: Upper abdomen pain and cramping; nausea Relevant Medical/Surgical History: hx of appy FINDINGS: Lower Chest:

## 2024-02-18 NOTE — DISCHARGE INSTRUCTIONS
You need to follow up closely with your PCP for your low blood sugar and low potassium.     Take nausea medicine and pain medicine as needed for symptoms

## 2024-09-26 RX ORDER — ACETAMINOPHEN AND CODEINE PHOSPHATE 120; 12 MG/5ML; MG/5ML
1 SOLUTION ORAL DAILY
Qty: 90 TABLET | Refills: 0 | OUTPATIENT
Start: 2024-09-26

## 2024-09-26 RX ORDER — NORETHINDRONE 0.35 MG/1
1 TABLET ORAL DAILY
Qty: 84 TABLET | OUTPATIENT
Start: 2024-09-26

## 2024-09-26 RX ORDER — ACETAMINOPHEN AND CODEINE PHOSPHATE 120; 12 MG/5ML; MG/5ML
1 SOLUTION ORAL DAILY
Qty: 90 TABLET | Refills: 0 | Status: SHIPPED | OUTPATIENT
Start: 2024-09-26 | End: 2024-11-06 | Stop reason: SDUPTHER

## 2024-11-06 ENCOUNTER — OFFICE VISIT (OUTPATIENT)
Dept: OBGYN CLINIC | Age: 29
End: 2024-11-06

## 2024-11-06 VITALS
WEIGHT: 211.2 LBS | HEIGHT: 62 IN | DIASTOLIC BLOOD PRESSURE: 78 MMHG | BODY MASS INDEX: 38.87 KG/M2 | HEART RATE: 79 BPM | SYSTOLIC BLOOD PRESSURE: 132 MMHG

## 2024-11-06 DIAGNOSIS — Z30.41 ENCOUNTER FOR SURVEILLANCE OF CONTRACEPTIVE PILLS: ICD-10-CM

## 2024-11-06 DIAGNOSIS — Z12.4 PAP SMEAR FOR CERVICAL CANCER SCREENING: ICD-10-CM

## 2024-11-06 DIAGNOSIS — Z98.891 HISTORY OF C-SECTION: ICD-10-CM

## 2024-11-06 DIAGNOSIS — Z87.59 HISTORY OF HEMOLYSIS, ELEVATED LIVER ENZYMES, AND LOW PLATELET (HELLP) SYNDROME: ICD-10-CM

## 2024-11-06 DIAGNOSIS — E66.9 OBESITY (BMI 30-39.9): ICD-10-CM

## 2024-11-06 DIAGNOSIS — Z01.419 WOMEN'S ANNUAL ROUTINE GYNECOLOGICAL EXAMINATION: Primary | ICD-10-CM

## 2024-11-06 DIAGNOSIS — I10 CHRONIC HYPERTENSION: ICD-10-CM

## 2024-11-06 RX ORDER — ACETAMINOPHEN AND CODEINE PHOSPHATE 120; 12 MG/5ML; MG/5ML
1 SOLUTION ORAL DAILY
Qty: 90 TABLET | Refills: 3 | Status: SHIPPED | OUTPATIENT
Start: 2024-11-06

## 2024-11-12 LAB
HPV HR 12 DNA SPEC QL NAA+PROBE: NOT DETECTED
HPV16 DNA SPEC QL NAA+PROBE: NOT DETECTED
HPV16+18+H RISK 12 DNA SPEC-IMP: NORMAL
HPV18 DNA SPEC QL NAA+PROBE: NOT DETECTED

## 2024-11-17 PROBLEM — Z30.011 ENCOUNTER FOR INITIAL PRESCRIPTION OF CONTRACEPTIVE PILLS: Status: RESOLVED | Noted: 2023-10-28 | Resolved: 2024-11-17

## 2024-11-17 PROBLEM — Z98.891 S/P REPEAT LOW TRANSVERSE C-SECTION: Status: RESOLVED | Noted: 2022-11-16 | Resolved: 2024-11-17

## (undated) DEVICE — S/USE RESUS KIT W/O MASK (10): Brand: FISHER & PAYKEL HEALTHCARE

## (undated) DEVICE — Device

## (undated) DEVICE — CHLORAPREP 26ML ORANGE

## (undated) DEVICE — BLADE CLIPPER GEN PURP NS

## (undated) DEVICE — TRAY URIN CATH 16FR DRNGE BG STATLOK STBL DEV F SURSTP

## (undated) DEVICE — GLOVE SURG SZ 65 THK91MIL LTX FREE SYN POLYISOPRENE

## (undated) DEVICE — GARMENT COMPR L FOR 23IN CALF FLOTRN

## (undated) DEVICE — SOLUTION IV IRRIG POUR BRL 0.9% SODIUM CHL 2F7124

## (undated) DEVICE — DRESSING COMP IS W4XL10IN PD W2XL8IN CNTCT LAYR ADH

## (undated) DEVICE — SUTURE VCRL SZ 0 L36IN ABSRB UD L36MM CT-1 1/2 CIR J946H

## (undated) DEVICE — SUTURE VCRL SZ 4-0 L27IN ABSRB UD L60MM KS STR REV CUT NDL J662H

## (undated) DEVICE — SUTURE ABSORBABLE BRAIDED 2-0 CT-1 27 IN UD VICRYL J259H

## (undated) DEVICE — PAD,NON-ADHERENT,3X8,STERILE,LF,1/PK: Brand: MEDLINE

## (undated) DEVICE — 3M™ STERI-STRIP™ COMPOUND BENZOIN TINCTURE 40 BAGS/CARTON 4 CARTONS/CASE C1544: Brand: 3M™ STERI-STRIP™